# Patient Record
Sex: FEMALE | Race: WHITE | Employment: FULL TIME | URBAN - METROPOLITAN AREA
[De-identification: names, ages, dates, MRNs, and addresses within clinical notes are randomized per-mention and may not be internally consistent; named-entity substitution may affect disease eponyms.]

---

## 2017-02-15 ENCOUNTER — OFFICE VISIT (OUTPATIENT)
Dept: DERMATOLOGY | Facility: CLINIC | Age: 52
End: 2017-02-15
Payer: COMMERCIAL

## 2017-02-15 VITALS — SYSTOLIC BLOOD PRESSURE: 147 MMHG | DIASTOLIC BLOOD PRESSURE: 86 MMHG | OXYGEN SATURATION: 100 % | HEART RATE: 80 BPM

## 2017-02-15 DIAGNOSIS — K14.6 BURNING MOUTH SYNDROME: Primary | ICD-10-CM

## 2017-02-15 DIAGNOSIS — L70.0 ACNE VULGARIS: ICD-10-CM

## 2017-02-15 LAB
ANION GAP SERPL CALCULATED.3IONS-SCNC: 9 MMOL/L (ref 3–14)
BUN SERPL-MCNC: 6 MG/DL (ref 7–30)
CALCIUM SERPL-MCNC: 9.4 MG/DL (ref 8.5–10.1)
CHLORIDE SERPL-SCNC: 101 MMOL/L (ref 94–109)
CO2 SERPL-SCNC: 27 MMOL/L (ref 20–32)
CREAT SERPL-MCNC: 0.79 MG/DL (ref 0.52–1.04)
FERRITIN SERPL-MCNC: 71 NG/ML (ref 8–252)
GFR SERPL CREATININE-BSD FRML MDRD: 77 ML/MIN/1.7M2
GLUCOSE SERPL-MCNC: 84 MG/DL (ref 70–99)
POTASSIUM SERPL-SCNC: 4.2 MMOL/L (ref 3.4–5.3)
SODIUM SERPL-SCNC: 137 MMOL/L (ref 133–144)
TSH SERPL DL<=0.005 MIU/L-ACNC: 1.78 MU/L (ref 0.4–4)
VIT B12 SERPL-MCNC: 652 PG/ML (ref 193–986)

## 2017-02-15 PROCEDURE — 80048 BASIC METABOLIC PNL TOTAL CA: CPT | Performed by: PHYSICIAN ASSISTANT

## 2017-02-15 PROCEDURE — 84425 ASSAY OF VITAMIN B-1: CPT | Mod: 90 | Performed by: PHYSICIAN ASSISTANT

## 2017-02-15 PROCEDURE — 84207 ASSAY OF VITAMIN B-6: CPT | Mod: 90 | Performed by: PHYSICIAN ASSISTANT

## 2017-02-15 PROCEDURE — 82607 VITAMIN B-12: CPT | Performed by: PHYSICIAN ASSISTANT

## 2017-02-15 PROCEDURE — 84443 ASSAY THYROID STIM HORMONE: CPT | Performed by: PHYSICIAN ASSISTANT

## 2017-02-15 PROCEDURE — 84252 ASSAY OF VITAMIN B-2: CPT | Mod: 90 | Performed by: PHYSICIAN ASSISTANT

## 2017-02-15 PROCEDURE — 99213 OFFICE O/P EST LOW 20 MIN: CPT | Performed by: PHYSICIAN ASSISTANT

## 2017-02-15 PROCEDURE — 84630 ASSAY OF ZINC: CPT | Mod: 90 | Performed by: PHYSICIAN ASSISTANT

## 2017-02-15 PROCEDURE — 99000 SPECIMEN HANDLING OFFICE-LAB: CPT | Performed by: PHYSICIAN ASSISTANT

## 2017-02-15 PROCEDURE — 82728 ASSAY OF FERRITIN: CPT | Performed by: PHYSICIAN ASSISTANT

## 2017-02-15 PROCEDURE — 36415 COLL VENOUS BLD VENIPUNCTURE: CPT | Performed by: PHYSICIAN ASSISTANT

## 2017-02-15 RX ORDER — LEVOTHYROXINE SODIUM 100 UG/1
100 TABLET ORAL DAILY
COMMUNITY

## 2017-02-15 RX ORDER — FLUOCINOLONE ACETONIDE 0.25 MG/G
OINTMENT TOPICAL
Qty: 15 G | Refills: 0 | Status: SHIPPED | OUTPATIENT
Start: 2017-02-15 | End: 2018-02-27

## 2017-02-15 RX ORDER — SPIRONOLACTONE 50 MG/1
TABLET, FILM COATED ORAL
Qty: 90 TABLET | Refills: 5 | Status: SHIPPED | OUTPATIENT
Start: 2017-02-15 | End: 2017-09-08

## 2017-02-15 NOTE — MR AVS SNAPSHOT
"              After Visit Summary   2/15/2017    Lolly Azevedo    MRN: [id number]           Patient Information     Date Of Birth          1965        Visit Information        Provider Department      2/15/2017 8:20 AM Chaparrita Roberts PA-C [facility]        Today's Diagnoses     Burning mouth syndrome    -  1    Lip licking dermatitis        Acne vulgaris          Care Instructions    Apply Aquaphor or Vaseline on lips.  Apply fluocinolone ointment twice daily as needed.       Check labs today.   Start spironolactone 100 mg in the morning and 50 mg in the evening.   Continue tretinoin.         Follow-ups after your visit        Who to contact     If you have questions or need follow up information about today's clinic visit or your schedule please contact [facility] directly at [phone].  Normal or non-critical lab and imaging results will be communicated to you by [email or web address], letter or phone within 4 business days after the clinic has received the results. If you do not hear from us within 7 days, please contact the clinic through [email or web address] or phone. If you have a critical or abnormal lab result, we will notify you by phone as soon as possible.  Submit refill requests through itzat or call your pharmacy and they will forward the refill request to us. Please allow 3 business days for your refill to be completed.          Additional Information About Your Visit        MyChart Information     itzat lets you send messages to your doctor, view your test results, renew your prescriptions, schedule appointments and more. To sign up, go to [email or web address] . Click on \"Log in\" on the left side of the screen, which will take you to the Welcome page. Then click on \"Sign up Now\" on the right side of the page.     You will be asked to enter the access code listed below, as well as some personal information. Please follow the directions to create your username and " password.     Your access code is: [email or web address]  Expires: 2017  8:41 AM     Your access code will  in 90 days. If you need help or a new code, please call your [facility] or [phone].        Care EveryWhere ID     This is your Care EveryWhere ID. This could be used by other organizations to access your [facility] medical records  [id number]        Your Vitals Were     Pulse Pulse Oximetry                80 100%           Blood Pressure from Last 3 Encounters:   02/15/17 147/86   16 117/85    Weight from Last 3 Encounters:   No data found for Wt              We Performed the Following     Basic metabolic panel  (Ca, Cl, CO2, Creat, Gluc, K, Na, BUN)     TSH with free T4 reflex     Vitamin B1 whole blood     Vitamin B12     Vitamin B6          Today's Medication Changes          These changes are accurate as of: 2/15/17  8:41 AM.  If you have any questions, ask your nurse or doctor.               Start taking these medicines.        Dose/Directions    fluocinolone 0.025 % ointment   Commonly known as:  SYNALAR   Used for:  Lip licking dermatitis   Started by:  Chaparrita Roberts PA-C        Apply twice daily to lips for 2-3 weeks.   Quantity:  15 g   Refills:  0         These medicines have changed or have updated prescriptions.        Dose/Directions    * spironolactone 50 MG tablet   Commonly known as:  ALDACTONE   This may have changed:  Another medication with the same name was added. Make sure you understand how and when to take each.   Used for:  Acne vulgaris   Changed by:  Simeon Ty MD        Dose:  50 mg   Take 1 tablet (50 mg) by mouth 2 times daily   Quantity:  180 tablet   Refills:  3       * spironolactone 50 MG tablet   Commonly known as:  ALDACTONE   This may have changed:  You were already taking a medication with the same name, and this prescription was added. Make sure you understand how and when to take each.   Used for:  Acne vulgaris   Changed by:   Chaparrita Roberts PA-C        Take 100 mg (2 pills) in the morning and 50 mg in the evening (1 pill).   Quantity:  90 tablet   Refills:  5       * Notice:  This list has 2 medication(s) that are the same as other medications prescribed for you. Read the directions carefully, and ask your doctor or other care provider to review them with you.         Where to get your medicines      These medications were sent to [id number] - [place], [place] - [address]  [address] [address] [address]     Phone:  [phone]     fluocinolone 0.025 % ointment    spironolactone 50 MG tablet                Primary Care Provider    David Cnonolly MD       XXX RETIRED XXX XXX  [place] [address]        Thank you!     Thank you for choosing [facility]  for your care. Our goal is always to provide you with excellent care. Hearing back from our patients is one way we can continue to improve our services. Please take a few minutes to complete the written survey that you may receive in the mail after your visit with us. Thank you!             Your Updated Medication List - Protect others around you: Learn how to safely use, store and throw away your medicines at www.disposemymeds.org.          This list is accurate as of: 2/15/17  8:41 AM.  Always use your most recent med list.                   Brand Name Dispense Instructions for use    fluocinolone 0.025 % ointment    SYNALAR    15 g    Apply twice daily to lips for 2-3 weeks.       LASIX PO      Take by mouth daily       levothyroxine 100 MCG tablet    SYNTHROID/LEVOTHROID     Take 100 mcg by mouth daily       POTASSIUM CITRATE PO      Take 20 mEq by mouth       * spironolactone 50 MG tablet    ALDACTONE    180 tablet    Take 1 tablet (50 mg) by mouth 2 times daily       * spironolactone 50 MG tablet    ALDACTONE    90 tablet    Take 100 mg (2 pills) in the morning and 50 mg in the evening (1 pill).       TEMAZEPAM  PO      Take by mouth At Bedtime       tretinoin 0.1 % cream    RETIN-A    20 g    Apply a pea sized amount at bedtime       * Notice:  This list has 2 medication(s) that are the same as other medications prescribed for you. Read the directions carefully, and ask your doctor or other care provider to review them with you.

## 2017-02-15 NOTE — NURSING NOTE
Chief Complaint   Patient presents with     Derm Problem     fu bumps and lips       Initial /86  Pulse 80  SpO2 100% There is no height or weight on file to calculate BMI.  Medication Reconciliation: unable or not appropriate to perform     Deedee Cavazos LPN.................2/15/2017

## 2017-02-15 NOTE — PROGRESS NOTES
Lolly Azevedo is a 51 year old year old female patient here today for recheck acne vulgaris on lower half of face and rash on lips.  Patient reports that she is taking spironolactone 50 mg twice daily. She denies any side effects from medication. She also notes within the last two month she has had some peeling lips. She reports she has tried may different lip balms with little improvement. She reports that it will seem to improve at times but will also have a burning sensation to both lips. She reports aquaphor lip balm seem to help the most.  Patient has no other skin complaints today.  Remainder of the HPI, Meds, PMH, Allergies, FH, and SH was reviewed in chart.    Pertinent Hx:  Acne Vulgaris   History reviewed. No pertinent past medical history.    History reviewed. No pertinent past surgical history.     Family History   Problem Relation Age of Onset     CANCER Mother      skin cancer       Social History     Social History     Marital status:      Spouse name: N/A     Number of children: N/A     Years of education: N/A     Occupational History     Not on file.     Social History Main Topics     Smoking status: Never Smoker     Smokeless tobacco: Not on file     Alcohol use Not on file     Drug use: Not on file     Sexual activity: Not on file     Other Topics Concern     Not on file     Social History Narrative     No narrative on file       Outpatient Encounter Prescriptions as of 2/15/2017   Medication Sig Dispense Refill     levothyroxine (SYNTHROID/LEVOTHROID) 100 MCG tablet Take 100 mcg by mouth daily       POTASSIUM CITRATE PO Take 20 mEq by mouth       Furosemide (LASIX PO) Take by mouth daily       TEMAZEPAM PO Take by mouth At Bedtime       fluocinolone (SYNALAR) 0.025 % ointment Apply twice daily to lips for 2-3 weeks. 15 g 0     spironolactone (ALDACTONE) 50 MG tablet Take 100 mg (2 pills) in the morning and 50 mg in the evening (1 pill). 90 tablet 5     tretinoin (RETIN-A) 0.1 % cream  Apply a pea sized amount at bedtime 20 g 3     spironolactone (ALDACTONE) 50 MG tablet Take 1 tablet (50 mg) by mouth 2 times daily 180 tablet 3     No facility-administered encounter medications on file as of 2/15/2017.              Review Of Systems  Skin: As above  Eyes: negative  Ears/Nose/Throat: negative  Respiratory: No shortness of breath, dyspnea on exertion, cough, or hemoptysis  Cardiovascular: negative  Gastrointestinal: negative  Genitourinary: negative  Musculoskeletal: negative  Neurologic: negative  Psychiatric: negative  Hematologic/Lymphatic/Immunologic: negative  Endocrine: negative      O:   NAD, WDWN, Alert & Oriented, Mood & Affect wnl, Vitals stable   Here today alone   /86  Pulse 80  SpO2 100%   General appearance normal   Vitals stable   Alert, oriented and in no acute distress      Dry, irritation upper and lower lip   No open wounds on lips   No visible inflammatory nodules, papules on face     Eyes: Conjunctivae/lids:Normal     ENT: Lips    MSK:Normal    Pulm: Breathing Normal    Neuro/Psych: Orientation:Normal; Mood/Affect:Normal  A/P:  1. Favor irritant dermatitis on lips   Will check Vit B12, Vit B1, B2, B6, zinc, ferritin levels, and TSH to make sure nothing else is contributing to burning sensation.   Use Aquaphor ointment (for skin on lips) 4-5 times daily.   Apply fluocinolone ointment two to three times daily on lips.   Avoid using any other lip products.   If not improving in 2 weeks follow-up for biopsy.   2. Acne Vulgaris  Improving but still having some flaring.   Will increase spironolactone 100 mg in the morning and 50 mg in the evening.   Will check BMP today.   Continue tretinoin 0.1% cream at bedtime.   Wear daily sunscreen.

## 2017-02-15 NOTE — PATIENT INSTRUCTIONS
Apply Aquaphor or Vaseline on lips.  Apply fluocinolone ointment twice daily as needed.       Check labs today.   Start spironolactone 100 mg in the morning and 50 mg in the evening.   Continue tretinoin.

## 2017-02-16 LAB — ZINC SERPL-MCNC: 96 UG/ML

## 2017-02-17 LAB
VIT B1 BLD-MCNC: 77 UG/DL
VIT B6 SERPL-MCNC: 227.5 NG/ML

## 2017-02-23 LAB — VIT B2 SERPL-MCNC: 8 UG/DL

## 2017-02-27 ENCOUNTER — TELEPHONE (OUTPATIENT)
Dept: NURSING | Facility: CLINIC | Age: 52
End: 2017-02-27

## 2017-02-27 NOTE — TELEPHONE ENCOUNTER
"Call Type: Triage Call    Presenting Problem: Patient returning clinic call regarding labs.  Gave message per MD/epic: Notes Recorded by Chaparrita Roberts PA-C on 2/24/2017 at 7:12 AM  Vitamin B2 is normal. Does she take an  extra vitamin B supplement? Her vitamin b6 is elevated, sometimes  this can cause lip burning sensation.  \"The only thing I take are 2  adult gummy multi vitamins /day\". Pt. will follow up with MD if  needed. \"The medication she gave me for my lip has really worked.\"  Triage Note:  Guideline Title: Information Only Call; No Symptom Triage (Adult)  Recommended Disposition: Provide Information or Advice Only  Original Inclination: Wanted to speak with a nurse  Override Disposition:  Intended Action: Follow advice given  Physician Contacted: No  Requesting information regarding scheduled exam, test or procedure; no triage  required. Information provided from approved resources or clinical experience. ?  YES  Requesting regular office appointment ? NO  Sign(s) or symptom(s) associated with a diagnosed condition or with a new illness  ? NO  Requesting information about provider, services or community resources ? NO  Call back to complete assessment/clarification of information from prior caller to  complete triage ? NO  Requesting information and provider is best resource; no triage required. ? NO  Caller not with patient and is unable to provide clinical information about  patient to facilitate triage. ? NO  Requesting provider information for recently scheduled test, procedure; no triage  required. Needed information not available per approved resources or clinical  experience. ? NO  Requesting information not available per approved reference or clinical  experience; no triage required. ? NO  Physician Instructions:  Care Advice:  "

## 2017-09-08 DIAGNOSIS — L70.0 ACNE VULGARIS: ICD-10-CM

## 2017-09-08 RX ORDER — SPIRONOLACTONE 50 MG/1
TABLET, FILM COATED ORAL
Qty: 90 TABLET | Refills: 5 | Status: SHIPPED | OUTPATIENT
Start: 2017-09-08 | End: 2018-03-09

## 2017-12-20 ENCOUNTER — OFFICE VISIT (OUTPATIENT)
Dept: SLEEP MEDICINE | Facility: CLINIC | Age: 52
End: 2017-12-20
Payer: COMMERCIAL

## 2017-12-20 VITALS
OXYGEN SATURATION: 100 % | DIASTOLIC BLOOD PRESSURE: 69 MMHG | WEIGHT: 145.2 LBS | SYSTOLIC BLOOD PRESSURE: 98 MMHG | HEART RATE: 72 BPM | HEIGHT: 69 IN | BODY MASS INDEX: 21.51 KG/M2

## 2017-12-20 DIAGNOSIS — F51.04 PSYCHOPHYSIOLOGICAL INSOMNIA: Primary | ICD-10-CM

## 2017-12-20 PROCEDURE — 99205 OFFICE O/P NEW HI 60 MIN: CPT | Performed by: FAMILY MEDICINE

## 2017-12-20 NOTE — NURSING NOTE
"Chief Complaint   Patient presents with     Sleep Problem       Initial BP 98/69  Pulse 72  Ht 1.746 m (5' 8.75\")  Wt 65.9 kg (145 lb 3.2 oz)  SpO2 100%  BMI 21.6 kg/m2 Estimated body mass index is 21.6 kg/(m^2) as calculated from the following:    Height as of this encounter: 1.746 m (5' 8.75\").    Weight as of this encounter: 65.9 kg (145 lb 3.2 oz).  Medication Reconciliation: complete   "
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
## 2017-12-20 NOTE — PATIENT INSTRUCTIONS
Your BMI is Body mass index is 21.6 kg/(m^2).  Weight management is a personal decision.  If you are interested in exploring weight loss strategies, the following discussion covers the approaches that may be successful. Body mass index (BMI) is one way to tell whether you are at a healthy weight, overweight, or obese. It measures your weight in relation to your height.  A BMI of 18.5 to 24.9 is in the healthy range. A person with a BMI of 25 to 29.9 is considered overweight, and someone with a BMI of 30 or greater is considered obese. More than two-thirds of [place] adults are considered overweight or obese.  Being overweight or obese increases the risk for further weight gain. Excess weight may lead to heart disease and diabetes.  Creating and following plans for healthy eating and physical activity may help you improve your health.  Weight control is part of healthy lifestyle and includes exercise, emotional health, and healthy eating habits. Careful eating habits lifelong are the mainstay of weight control. Though there are significant health benefits from weight loss, long-term weight loss with diet alone may be very difficult to achieve- studies show long-term success with dietary management in less than 10% of people. Attaining a healthy weight may be especially difficult to achieve in those with severe obesity. In some cases, medications, devices and surgical management might be considered.  What can you do?  If you are overweight or obese and are interested in methods for weight loss, you should discuss this with your provider.     Consider reducing daily calorie intake by 500 calories.     Keep a food journal.     Avoiding skipping meals, consider cutting portions instead.    Diet combined with exercise helps maintain muscle while optimizing fat loss. Strength training is particularly important for building and maintaining muscle mass. Exercise helps reduce stress, increase energy, and improves fitness.  Increasing exercise without diet control, however, may not burn enough calories to loose weight.       Start walking three days a week 10-20 minutes at a time    Work towards walking thirty minutes five days a week     Eventually, increase the speed of your walking for 1-2 minutes at time    In addition, we recommend that you review healthy lifestyles and methods for weight loss available through the National Institutes of Health patient information sites:  http://win.niddk.nih.gov/publications/index.htm    And look into health and wellness programs that may be available through your health insurance provider, employer, local community center, or violet club.       [facility]    Good sleeping habits are a key part of treatment. If needed, and only in very select cases, some medications may help you sleep better at first. Making healthy lifestyle changes and learning to relax can improve your overall sleep in the long run. As many long term changes and treatments, treating insomnia takes commitment and hard work, but trust that your efforts will pay off.  We are recommending treatment of insomnia based on your history of sleep problems and/or use of medications for sleeping.  There are many treatment options available and we want to work with you to find the right treatment for you.  The following recommendations can be helpful for some people.        Sleep Hygiene     Sometimes insomnia can be made worse by our own habits or situation.  You should consider making some of the following changes to help improve your sleep:       If there is excessive noise in your house / neighborhood use a fan or similar device to make a quiet background noise that can drown out other noises.    If your room is too bright early in the morning, hang a blanket or extra curtain over the windows to keep the light out or use a sleeping mask to cover your eyes.    If your room is too warm during the night, set the temperature  in the house down a few degrees for the night.    Spend a little time before bedtime trying to relax.  Do not work right up until bedtime.  Take 30-60 minutes to relax and unwind before bedtime with a book or watching TV.    Do not drink anything with alcohol or caffeine in them for at least 4 to 5 hours before bedtime.    Do not smoke right before bedtime or during the night.    No strenuous exercise for 2-3 hours before bedtime.      In addition to the abovementioned recommendations, cognitive behavioral treatment for insomnia (CBTI) is a very effective technique that involves changing your behaviors and thoughts associated with sleep. In fact, CBTI is the most effective treatment for long-term insomnia. This treatment modality tries to address the underlying causes of your sleep problems, including your habits and how you think about sleep.    People that are motivated to make changes in their sleep pattern are likely to benefit from internet based cognitive behavioral treatment for insomnia. Some internet CBTI programs include but are not limited to www.[employer] or www.[facility].FlowPay.  There is a fee for using these programs that is similar to actually seeing an insomnia expert. By entering the code  Rye  it will allow us to know if you find these services useful.        Online Programs for CBTI       www.[employer] (pronounced shut eye) - There is a fee for this program.    www.sleepIO.com (pronounced sleep ee oh) - There is a fee for this program. Enter the code  Rye  if you decide to enroll in this program.       In addition to the internet programs provided, self-help strategies for treatment of insomnia can be found also in books.  Several treatment books for insomnia are listed on our patient treatment resources.  Some of these include the following books:       Suggested Resources - Insomnia Treatment Books       Overcoming Insomnia  by Angel Mares and Julia Doyle (2008)     No More  Sleepless Nights  by Lucien Vallecillo and Cristal Mak (1996)     Say Monserrat to Insomnia  by Gus Vail (2009)     The Insomnia Workbook  by Noelle Roberson and Avinash Hammond (2009)     The Insomnia Answer  by Imer Wyatt and Live Pizarro (2006)     These recommendations are a first step in treating insomnia.  For many people these recommendations can be helpful while for others they are just the start. There are many treatments available for insomnia and our goal is to keep re-evaluating your progress and try other options if these first steps are not successful.  It has been demonstrated that, in the long run, CBTI modalities are more effective than any medication for insomnia. As such, if the above the recommendations do not help, you may benefit from scheduling an appointment with an insomnia expert (we can assess this during follow up visits).      Healthy Lifestyle  As it was mentioned, your lifestyle directly affects your health and your sleep patterns. Here are some healthy habits that you should consider:    Keep a regular sleep schedule, always going to bed and getting up at the same time each day.    Exercise regularly. It may help you reduce stress. However, remember to avoid strenuous exercise for two to four hours before bedtime.    Limit, or if possible, avoid all together naps.    Use your bed only for sleep and sex. This means, no reading, watching TV, using tablets or phones, or doing any other activities in bed.    Do not spend too much time in bed trying to fall asleep. If you cannot fall asleep within 20 to 30 minutes, get up and do something relaxing until you become tired and drowsy again. Do not expose yourself to bright lights or perform complex activities (e.g., no house cleaning, watching TV, or working on homework).    Avoid or limit caffeine and nicotine. They can keep you awake at night. Also avoid alcohol. It may help you fall asleep at first, but your sleep will not be  restful.      Before Bedtime  In addition to keeping some healthy habits, it is important to know what do to before going to sleep. As such, to also sleep better every night, try these tips:    Have a bedtime routine to let your body and mind know when it is time to sleep.    Going to bed should be relaxing so try to do only relaxing things around bedtime. Sleep will come sooner.    If your worries do not let you sleep, write them down in a diary. Then close it and go to bed.    As previously mentioned, make sure the room is not too hot or too cold. If it is not dark enough, an eye mask may help. If it is noisy, try using earplugs.      Learn to Relax  In addition to poor habits, stress, anxiety, and body tension may play a large role in your inability to sleep through the night. In fact, these factors may keep you awake at night. To unwind before bedtime, try reading a book, meditation, or even yoga. Also, try the following:    Deep breathing: Sit or lie back in a chair. Take a slow, deep breath. Hold it for 5 counts. Then breathe out slowly through your mouth. Keep doing this until you feel relaxed.    Imagery: Think of the last fun trip you took. In your mind, walk through the trip from start to finish. Put as much detail into the memory as you can remember. It will also help you relax.      Suggested Resources    Insomnia Treatment Books      Overcoming Insomnia  by Angel Mares and Julia Doyle (2008)     No More Sleepless Nights  by Lucien Vallecillo and Cristal Mak (1996)     Say Monserrat to Insomnia  by Gus Vail (2009)     The Insomnia Workbook  by Noelle Roberson and Avinash Hammond (2009)     The Insomnia Answer  by Imer Wyatt and Live Pizarro (2006)    Stress Management and Relaxation Books    The Relaxation and Stress Reduction Workbook by Milady Staples, Nona Marie and Kavon Remy (2008)    Stress Management Workbook: Techniques and Self-Assessment Procedures by Jennie  [facility] Garcia and Alexis Magallon (1997)    A Mindfulness-Based Stress Reduction Workbook by Reji Valdez and Carly Grijalva (2010)    The Complete Stress Management Workbook by Doug Sloan, Han Figueroa and Yobani Loving (1996)    Assert Yourself by Domitila Elam and Tim Elam (1977)    Relaxation Resources for Computer Download   These websites offer resources to help you relax. This list is for information only. [facility] is not responsible for the quality of services or the actions of any person or organization.    Progressive Muscle Relaxation (PMR):     http://www.MiniMonos/progressive-muscle-relaxation-exercise.html     http://studentsupport.[facility]/counseling/resources/self-help/relaxation-and-stress-management/     Deep Breathing Exercises:    http://www.MiniMonos/breathing-awareness.html     Meditation:     wwwBattery Medics    www.KiiguidedFree & ClearmeditationFree & Clearsite.DeckDAQ (you may have to pay for some of these resources)    Guided Imagery:    http://www.MiniMonos/guided-imagery-scripts.html     http://MedPlasts/library/gcwdkbulkx-pokwye-xssfpez/     Counseling / Behavioral Health    [facility]    Visit [email or web address] or call [phone] to find a clinic close to you.      This is not a prescription and these resources are optional. You must pay for any costs when using these resources. Please ask your insurance carrier if you can be reimbursed for these resources. If so, you are responsible for sending the needed details to your insurance carrier. These resources may also be tax deductible as medical expenses. Check with your .     These programs and publications are not affiliated in any way with [facility].    Stress, tension, anxiety and depression can be big problems that contribute to insomnia.  If you can t relax your mind and body, then you won t be able to sleep.  You seem to have a high  level of stress in your life and would likely benefit from professional stress reduction / anxiety management strategies and should contact [facility] at [phone] to schedule an appointment.       If you are experiencing extreme anxiety or distress due to insomnia symptoms and feel that you need immediate assistance, please contact the [facility] at [phone].    Please, call and make appointment with sleep psychologist. Complete and bring with you the sleep logs.    Thank you so much and happy holidays!!    Sincerely,    Emmanuel Albert MD, MPH  Clinical Sleep Medicine

## 2017-12-20 NOTE — MR AVS SNAPSHOT
After Visit Summary   12/20/2017    Lolly Azevedo    MRN: [id number]           Patient Information     Date Of Birth          1965        Visit Information        Provider Department      12/20/2017 9:30 AM Emmanuel Albert MD [facility]        Today's Diagnoses     Psychophysiological insomnia    -  1      Care Instructions      Your BMI is Body mass index is 21.6 kg/(m^2).  Weight management is a personal decision.  If you are interested in exploring weight loss strategies, the following discussion covers the approaches that may be successful. Body mass index (BMI) is one way to tell whether you are at a healthy weight, overweight, or obese. It measures your weight in relation to your height.  A BMI of 18.5 to 24.9 is in the healthy range. A person with a BMI of 25 to 29.9 is considered overweight, and someone with a BMI of 30 or greater is considered obese. More than two-thirds of American adults are considered overweight or obese.  Being overweight or obese increases the risk for further weight gain. Excess weight may lead to heart disease and diabetes.  Creating and following plans for healthy eating and physical activity may help you improve your health.  Weight control is part of healthy lifestyle and includes exercise, emotional health, and healthy eating habits. Careful eating habits lifelong are the mainstay of weight control. Though there are significant health benefits from weight loss, long-term weight loss with diet alone may be very difficult to achieve- studies show long-term success with dietary management in less than 10% of people. Attaining a healthy weight may be especially difficult to achieve in those with severe obesity. In some cases, medications, devices and surgical management might be considered.  What can you do?  If you are overweight or obese and are interested in methods for weight loss, you should discuss this with your provider.      Consider reducing daily calorie intake by 500 calories.     Keep a food journal.     Avoiding skipping meals, consider cutting portions instead.    Diet combined with exercise helps maintain muscle while optimizing fat loss. Strength training is particularly important for building and maintaining muscle mass. Exercise helps reduce stress, increase energy, and improves fitness. Increasing exercise without diet control, however, may not burn enough calories to loose weight.       Start walking three days a week 10-20 minutes at a time    Work towards walking thirty minutes five days a week     Eventually, increase the speed of your walking for 1-2 minutes at time    In addition, we recommend that you review healthy lifestyles and methods for weight loss available through the National Institutes of Health patient information sites:  http://win.niddk.nih.gov/publications/index.htm    And look into health and wellness programs that may be available through your health insurance provider, employer, local community center, or violet club.       [place] [facility]    Good sleeping habits are a key part of treatment. If needed, and only in very select cases, some medications may help you sleep better at first. Making healthy lifestyle changes and learning to relax can improve your overall sleep in the long run. As many long term changes and treatments, treating insomnia takes commitment and hard work, but trust that your efforts will pay off.  We are recommending treatment of insomnia based on your history of sleep problems and/or use of medications for sleeping.  There are many treatment options available and we want to work with you to find the right treatment for you.  The following recommendations can be helpful for some people.        Sleep Hygiene     Sometimes insomnia can be made worse by our own habits or situation.  You should consider making some of the following changes to help improve your sleep:        If there is excessive noise in your house / neighborhood use a fan or similar device to make a quiet background noise that can drown out other noises.    If your room is too bright early in the morning, hang a blanket or extra curtain over the windows to keep the light out or use a sleeping mask to cover your eyes.    If your room is too warm during the night, set the temperature in the house down a few degrees for the night.    Spend a little time before bedtime trying to relax.  Do not work right up until bedtime.  Take 30-60 minutes to relax and unwind before bedtime with a book or watching TV.    Do not drink anything with alcohol or caffeine in them for at least 4 to 5 hours before bedtime.    Do not smoke right before bedtime or during the night.    No strenuous exercise for 2-3 hours before bedtime.      In addition to the abovementioned recommendations, cognitive behavioral treatment for insomnia (CBTI) is a very effective technique that involves changing your behaviors and thoughts associated with sleep. In fact, CBTI is the most effective treatment for long-term insomnia. This treatment modality tries to address the underlying causes of your sleep problems, including your habits and how you think about sleep.    People that are motivated to make changes in their sleep pattern are likely to benefit from internet based cognitive behavioral treatment for insomnia. Some internet CBTI programs include but are not limited to www.[employer] or www.[employer]4Less.  There is a fee for using these programs that is similar to actually seeing an insomnia expert. By entering the code  [place]  it will allow us to know if you find these services useful.        Online Programs for CBTI       www.[employer] (pronounced shut eye) - There is a fee for this program.    www.sleepIO.com (pronounced sleep ee oh) - There is a fee for this program. Enter the code  [place]  if you decide to enroll in this program.       In  addition to the internet programs provided, self-help strategies for treatment of insomnia can be found also in books.  Several treatment books for insomnia are listed on our patient treatment resources.  Some of these include the following books:       Suggested Resources - Insomnia Treatment Books       Overcoming Insomnia  by Angel Mares and Julia Doyle (2008)     No More Sleepless Nights  by Lucien Vallecillo and Cristal Mak (1996)     Say Monserrat to Insomnia  by Gus Vail (2009)     The Insomnia Workbook  by Noelle Roberson and Avinash Hammond (2009)     The Insomnia Answer  by Imer Wyatt and Live Pizarro (2006)     These recommendations are a first step in treating insomnia.  For many people these recommendations can be helpful while for others they are just the start. There are many treatments available for insomnia and our goal is to keep re-evaluating your progress and try other options if these first steps are not successful.  It has been demonstrated that, in the long run, CBTI modalities are more effective than any medication for insomnia. As such, if the above the recommendations do not help, you may benefit from scheduling an appointment with an insomnia expert (we can assess this during follow up visits).      Healthy Lifestyle  As it was mentioned, your lifestyle directly affects your health and your sleep patterns. Here are some healthy habits that you should consider:    Keep a regular sleep schedule, always going to bed and getting up at the same time each day.    Exercise regularly. It may help you reduce stress. However, remember to avoid strenuous exercise for two to four hours before bedtime.    Limit, or if possible, avoid all together naps.    Use your bed only for sleep and sex. This means, no reading, watching TV, using tablets or phones, or doing any other activities in bed.    Do not spend too much time in bed trying to fall asleep. If you cannot fall asleep within 20  to 30 minutes, get up and do something relaxing until you become tired and drowsy again. Do not expose yourself to bright lights or perform complex activities (e.g., no house cleaning, watching TV, or working on homework).    Avoid or limit caffeine and nicotine. They can keep you awake at night. Also avoid alcohol. It may help you fall asleep at first, but your sleep will not be restful.      Before Bedtime  In addition to keeping some healthy habits, it is important to know what do to before going to sleep. As such, to also sleep better every night, try these tips:    Have a bedtime routine to let your body and mind know when it is time to sleep.    Going to bed should be relaxing so try to do only relaxing things around bedtime. Sleep will come sooner.    If your worries do not let you sleep, write them down in a diary. Then close it and go to bed.    As previously mentioned, make sure the room is not too hot or too cold. If it is not dark enough, an eye mask may help. If it is noisy, try using earplugs.      Learn to Relax  In addition to poor habits, stress, anxiety, and body tension may play a large role in your inability to sleep through the night. In fact, these factors may keep you awake at night. To unwind before bedtime, try reading a book, meditation, or even yoga. Also, try the following:    Deep breathing: Sit or lie back in a chair. Take a slow, deep breath. Hold it for 5 counts. Then breathe out slowly through your mouth. Keep doing this until you feel relaxed.    Imagery: Think of the last fun trip you took. In your mind, walk through the trip from start to finish. Put as much detail into the memory as you can remember. It will also help you relax.      Suggested Resources    Insomnia Treatment Books      Overcoming Insomnia  by Angel Mares and Julia Doyle (2008)     No More Sleepless Nights  by Lucien Vallecillo and Cristal Mak (1996)     Say Monserrat to Insomnia  by Gus Vail  (2009)     The Insomnia Workbook  by Noelle Roberson and Avinash Hammond (2009)     The Insomnia Answer  by Imer Wyatt and Live Pizarro (2006)    Stress Management and Relaxation Books    The Relaxation and Stress Reduction Workbook by Milady Staples, Nona Marie and Kavon Remy (2008)    Stress Management Workbook: Techniques and Self-Assessment Procedures by Jennie Garcia and Alexis Magallon (1997)    A Mindfulness-Based Stress Reduction Workbook by Reji Valdez and Carly Grijalva (2010)    The Complete Stress Management Workbook by Doug Sloan, Han Figueroa and Yobani Loving (1996)    Assert Yourself by Domitila Elam and Tim Elam (1977)    Relaxation Resources for Computer Download   These websites offer resources to help you relax. This list is for information only. [facility] is not responsible for the quality of services or the actions of any person or organization.    Progressive Muscle Relaxation (PMR):     http://www.MagTag/progressive-muscle-relaxation-exercise.html     http://studentsupport.[facility]/counseling/resources/self-help/relaxation-and-stress-management/     Deep Breathing Exercises:    http://www.MagTag/breathing-awareness.html     Meditation:     www.[place]    www.RUNformguidedTradingViewmeditation-site.[employer] (you may have to pay for some of these resources)    Guided Imagery:    http://www.MagTag/guided-imagery-scripts.html     http://[employer]/library/gwxsicodgg-azxqgo-muzaoej/     Counseling / Behavioral Health    [facility]    Visit [email or web address] or call [phone] to find a clinic close to you.      This is not a prescription and these resources are optional. You must pay for any costs when using these resources. Please ask your insurance carrier if you can be reimbursed for these resources. If so, you are responsible for sending the needed details to  your insurance carrier. These resources may also be tax deductible as medical expenses. Check with your .     These programs and publications are not affiliated in any way with [facility].    Stress, tension, anxiety and depression can be big problems that contribute to insomnia.  If you can t relax your mind and body, then you won t be able to sleep.  You seem to have a high level of stress in your life and would likely benefit from professional stress reduction / anxiety management strategies and should contact [facility] at [phone] to schedule an appointment.       If you are experiencing extreme anxiety or distress due to insomnia symptoms and feel that you need immediate assistance, please contact the [facility] at [phone].    Please, call and make appointment with sleep psychologist. Complete and bring with you the sleep logs.    Thank you so much and happy holidays!!    Sincerely,    Emmanuel Albret MD, MPH  Clinical Sleep Medicine              Follow-ups after your visit        Additional Services     SLEEP PSYCHOLOGY REFERRAL       Referral Urgency: Next available    Dr. Simeon Moore is at the following clinics on the following days:  [address]        Thursday and Friday (PLEASE CALL [phone] to schedule an appointment).  [facility] [address] [place]       Wednesdays   (PLEASE CALL [phone] to schedule an appointment).     Please be aware that coverage of these services is subject to the terms and limitations of your health insurance plan. Call member services at your health plan with any benefit or coverage questions.     Please bring the following to your appointment:  >> List of current medications   >> This referral request   >> Any documents/labs given to you for this referral                  Who to contact     If you have questions or need follow up  "information about today's clinic visit or your schedule please contact [facility] directly at [phone].  Normal or non-critical lab and imaging results will be communicated to you by MyChart, letter or phone within 4 business days after the clinic has received the results. If you do not hear from us within 7 days, please contact the clinic through [employer] or phone. If you have a critical or abnormal lab result, we will notify you by phone as soon as possible.  Submit refill requests through ROLI or call your pharmacy and they will forward the refill request to us. Please allow 3 business days for your refill to be completed.          Additional Information About Your Visit        Money360harScoreloop Information     ROLI lets you send messages to your doctor, view your test results, renew your prescriptions, schedule appointments and more. To sign up, go to [email or web address] . Click on \"Log in\" on the left side of the screen, which will take you to the Welcome page. Then click on \"Sign up Now\" on the right side of the page.     You will be asked to enter the access code listed below, as well as some personal information. Please follow the directions to create your username and password.     Your access code is: [email or web address]  Expires: 3/20/2018 10:36 AM     Your access code will  in 90 days. If you need help or a new code, please call your [facility] clinic or [phone].        Care EveryWhere ID     This is your Care EveryWhere ID. This could be used by other organizations to access your [facility] medical records  [id number]        Your Vitals Were     Pulse Height Pulse Oximetry BMI (Body Mass Index)          72 1.746 m (5' 8.75\") 100% 21.6 kg/m2         Blood Pressure from Last 3 Encounters:   17 98/69   02/15/17 147/86   16 117/85    Weight from Last 3 Encounters:   17 65.9 kg (145 lb 3.2 oz)              We Performed the Following     SLEEP PSYCHOLOGY REFERRAL     "    Primary Care Provider    David Connolly MD       XXX RETIRED XXX XXX  XXX [place] [address]        Equal Access to Services     AISHA STEVENRANJEET : Hadii hay early tracey Pagan, wanieshada isidroyariel, kriss motta drakekain, jennifer marjanin hayaagutierrez juantristen gil laBenclare snyder. So [facility] [phone].    ATENCIÓN: Si habla español, tiene a cintron disposición servicios gratuitos de asistencia lingüística. Llame al [phone].    We comply with applicable federal civil rights laws and [place] laws. We do not discriminate on the basis of race, color, national origin, age, disability, sex, sexual orientation, or gender identity.            Thank you!     Thank you for choosing [facility]  for your care. Our goal is always to provide you with excellent care. Hearing back from our patients is one way we can continue to improve our services. Please take a few minutes to complete the written survey that you may receive in the mail after your visit with us. Thank you!             Your Updated Medication List - Protect others around you: Learn how to safely use, store and throw away your medicines at www.disposemymeds.org.          This list is accurate as of: 12/20/17 10:38 AM.  Always use your most recent med list.                   Brand Name Dispense Instructions for use Diagnosis    fluocinolone 0.025 % ointment    SYNALAR    15 g    Apply twice daily to lips for 2-3 weeks.        LASIX PO      Take by mouth daily        levothyroxine 100 MCG tablet    SYNTHROID/LEVOTHROID     Take 100 mcg by mouth daily        POTASSIUM CITRATE PO      Take 20 mEq by mouth        spironolactone 50 MG tablet    ALDACTONE    90 tablet    Take 100 mg (2 pills) in the morning and 50 mg in the evening (1 pill).    Acne vulgaris       TEMAZEPAM PO      Take by mouth At Bedtime        tretinoin 0.1 % cream    RETIN-A    20 g    Apply a pea sized amount at bedtime    Acne vulgaris

## 2018-01-12 ENCOUNTER — OFFICE VISIT (OUTPATIENT)
Dept: SLEEP MEDICINE | Facility: CLINIC | Age: 53
End: 2018-01-12
Attending: FAMILY MEDICINE
Payer: COMMERCIAL

## 2018-01-12 VITALS
WEIGHT: 145 LBS | OXYGEN SATURATION: 100 % | HEART RATE: 85 BPM | HEIGHT: 69 IN | SYSTOLIC BLOOD PRESSURE: 127 MMHG | BODY MASS INDEX: 21.48 KG/M2 | DIASTOLIC BLOOD PRESSURE: 75 MMHG

## 2018-01-12 DIAGNOSIS — F51.04 PSYCHOPHYSIOLOGICAL INSOMNIA: Primary | ICD-10-CM

## 2018-01-12 PROCEDURE — 90791 PSYCH DIAGNOSTIC EVALUATION: CPT | Performed by: PSYCHOLOGIST

## 2018-01-12 ASSESSMENT — ANXIETY QUESTIONNAIRES
3. WORRYING TOO MUCH ABOUT DIFFERENT THINGS: NEARLY EVERY DAY
7. FEELING AFRAID AS IF SOMETHING AWFUL MIGHT HAPPEN: NOT AT ALL
5. BEING SO RESTLESS THAT IT IS HARD TO SIT STILL: SEVERAL DAYS
2. NOT BEING ABLE TO STOP OR CONTROL WORRYING: NEARLY EVERY DAY
IF YOU CHECKED OFF ANY PROBLEMS ON THIS QUESTIONNAIRE, HOW DIFFICULT HAVE THESE PROBLEMS MADE IT FOR YOU TO DO YOUR WORK, TAKE CARE OF THINGS AT HOME, OR GET ALONG WITH OTHER PEOPLE: SOMEWHAT DIFFICULT
1. FEELING NERVOUS, ANXIOUS, OR ON EDGE: MORE THAN HALF THE DAYS
6. BECOMING EASILY ANNOYED OR IRRITABLE: NEARLY EVERY DAY
GAD7 TOTAL SCORE: 13

## 2018-01-12 ASSESSMENT — PATIENT HEALTH QUESTIONNAIRE - PHQ9
SUM OF ALL RESPONSES TO PHQ QUESTIONS 1-9: 9
5. POOR APPETITE OR OVEREATING: SEVERAL DAYS

## 2018-01-12 NOTE — NURSING NOTE
"Chief Complaint   Patient presents with     Sleep Problem     consult-       Initial There were no vitals taken for this visit. Estimated body mass index is 21.6 kg/(m^2) as calculated from the following:    Height as of 12/20/17: 1.746 m (5' 8.75\").    Weight as of 12/20/17: 65.9 kg (145 lb 3.2 oz).  Medication Reconciliation: complete    "

## 2018-01-12 NOTE — PATIENT INSTRUCTIONS
Thank you for coming in to discuss your insomnia and cogntiive behavioral therapy for insomnia.     You are a good candidate for behavioral treatment for insomnia.  Use of temazepam at high doses is not indicatged for long term use and there is increasing evidence that it is contraindicated.  The more effective long term treatment for insomnia is CBTI.    Continue to work with your primary care physician around management of your anxiety.  This contributes to your insomnia and with adequate treatment may help improve your sleep as well.  You are reporting good reponse to Lexapro.    Today we are prescribing you a fixed sleep schedule of 8 hours between 10 PM and 6 AM.    Avoid later evening use of alcohol as it impairs sleep and is not recommended to be combined when taking a benzodiazepine.    Within your sleep window you should follow these rules for sleep:    Insomnia - Stimulus Control  When someone lays awake in bed over many nights, your body can actually learn to be awake in bed, mainly because that is what has happened so many times.  Your body has actually been  conditioned  or trained to be awake during the night because it has happened so often.  To break this habit you should try to follow these steps to improve your insomnia:    Set a strict bedtime and rise time to keep every day of the week, including weekends as prescribed abuve.    Go to bed at the set time, but only if you are sleepy (not tired or fatigued but drowsy)    Don t lay in bed for more than 15-30 minutes if you can t sleep.  Get up and go do something relaxing like reading or watching TV until you get drowsy again     Get up at the same time every day regardless of how much sleep you get    Use the bedroom only for sleep and sex.  Do NOT watch TV, read, use the computer, play on your cell phone or do work while in bed      Do not take naps during the daytime and avoid any situations where you might get drowsy or fall asleep  unintentionally especially in the evening.               DO NOT MONITOR THE CLOCK IN THE MIDDLE OF THE NIGHT>             Stop using your phone to track your sleep.    Put the phone away at 7 PM as you have already started doing.    STRATEGIES FOR DEVELOPING A HEALTHY ATTITUDE ABOUT SLEEP    Insomnia is often is triggered by stressful events such as a change in employment, a separation, medical illness, or loss.  Your response to your sleep problem, mainly your thoughts and behaviors, determines in large part whether the sleep disturbance will resolve or develop a chronic course well after the stressful event is over.  Insomnia is more likely to persist over time if a person interprets this situational insomnia as a sign of danger or loss of control - and because of this begins to monitor sleep loss and worry about its consequences.  Unhealthy worry, fears and beliefs about insomnia can become a vicious cycle of emotional distress and increased sleep disturbance.  Negative beliefs about sleep produce increasing stress and pressure to sleep.  We call this unhelpful sleep effort. The following are strategies for changing beliefs and attitudes about sleep than may be contributing to your continued insomnia.    Changing How You Think About Insomnia      You may be surprised to learn that prior to the 1900 s, most medicines given by physicians were worthless.  Yet, remarkably, patients still improved.  How was this possible?  It was due to the patient s belief in the physician and the medicine, which mobilized strong self-healing mechanisms.  Today, this effect has been referred to as the placebo effect and it is recognized as a significant component of all medical treatments. Recent research has challenged the traditional separation of the mind and body in medicine. The placebo effect is a powerful reminder that that how we think and what we believe has a profound impact on our brain and body.  We now know that by  managing stress and thinking more positively we can improve our health and promote healing (1).  Negative sleep thoughts in particular can have a profoundly adverse effect on your insomnia. They can lead to elevated fear or anxiety about sleep which in turn can aggravate your sleep problem.    Examples of Unhelpful Sleep Thoughts    Unhelpful thoughts about sleep can have a profound impact on your ability to get a good night s sleep. Below are some examples of negative thoughts associated with insomnia that may sound familiar to you:     I must get 8 hours of sleep to function during the day      Insomnia is going to ruin my health      I have lost control of my sleep      I m dreading bedtime      I didn t sleep at all last night and know I won t sleep tomorrow night either      I can t sleep without a sleeping pill       These types of thoughts are often based on fear and myths about how sleep works. They produce feelings of anxiety and fear, emotions that fuel the stress response casing increased heart rate and brain wave activity.  This in turn activates your brain s wakefulness system and weakens your natural sleep drive resulting in increased difficulty falling and staying asleep.      Negative thoughts usually occur automatically and feel like a knee-jerk reaction.  They are often inaccurate and distorted, especially late at night as you become increasingly tired, it is dark, quiet, and others are asleep.      Cognitive Restructuring: Changing Unhelpful Sleep Thoughts    Now that you understand the impact that negative thoughts can have on your sleep, you are ready to change these unhelpful thoughts using cognitive restructuring.  The process - called cognitive restructuring   is powerfully simple:  By recognizing and replacing your negative thoughts about sleep with more accurate, positive thoughts you will be less anxious and frustrated.  You will stop working so hard to sleep.  As a result, you will be  able to relax and more easily sleep through the night.    There are several important steps involved in changing your unhelpful and negative thoughts about sleep:    1. The most important first step is recognizing and identifying your unhelpful sleep thoughts and the emotional reaction they produce.     2. A second and equally important step is to identify these thoughts without any judgment. It is important that you accept your thoughts as they are before you try to reframe and change them.     3.  The next step is to replace or reframe the negative thoughts related to sleep with more accurate and positive thoughts.  To facilitate this process, we have provided a list of positive, accurate thoughts to gradually incorporate into your beliefs about your sleep.  These positive thoughts are based on sleep research.  Read over these positive thoughts and choose those that seem to best reflect your situation.  We also encourage you to generate your own positive and accurate about your sleep.  Whenever you notice a negative thought enter you mind, replace it with one of your positive thoughts.  Rehearse these positive thoughts each day.  By practicing cognitive restructuring each day, you will notice that your frame of mind will shift leading to a much more positive attitude about your sleep.      Examples of Helpful Sleep Thoughts     My work will not suffer significantly if I have a poor night's sleep.      I m probably getting more sleep than I think.  My daytime functioning is not affected only by my sleep.      Since I didn t sleep well last night, I am more likely to sleep well tonight due  to a biological pressure to recover my sleep loss.      Sleep requirements vary from person to person.      In most cases, the worst thing that can happen if I don t sleep well is that my mood might not be as bright the next day.      If I awaken after about five and a half hours of sleep, I have gotten all the sleep I really  need.      I m more likely to fall asleep as my body temperature falls throughout the night.      It is normal to initially feel alert if I awaken at the beginning or end of a dream; drowsiness will soon follow.      My functioning will improve during the day as my body temperature rises.      My sleep will be improving as I learn and apply the behavioral techniques I am taught.      These techniques have worked for others, and they will work for me.     Other Recommendations for Changing  Beliefs and Attitudes About Your Sleep    1. Keep Realistic Expectations:  There is a widespread belief that 8 hours of sleep is necessary to feel refreshed and function well during the day. Some believe that  good sleep  means never waking up at night.  Others come to expect that they should always wake up in the morning feeling completely rested and full of energy.  Concerns and worries may arise when such expectations are not met.  You may believe that you need this much sleep but the fact remains that there are individual differences in sleep requirements.  Some require as little as 4-5 hours whereas others require 10.  Thus, there is no  gold standard  for adequate sleep duration.  To overcome insomnia, you must keep your expectations realistic and understand that some days you may not sleep as well as you desire.  Average normal sleepers experience two nights of disrupted sleep per week. There is no reason to be alarmed by a few nights of poor sleep.  To overcome insomnia you need to avoid placing undue pressure on yourself to achieve certain sleep standards as this is likely to increase performance anxiety and perpetuate your sleep problem.     2. Revise Attributions about the Causes of Insomnia:  There is a natural tendency to attribute   one s sleep problem to external factors (e.g., chemical imbalance, pain, and aging), over which     a person may have little control.  Although these factors may contribute to your sleep  disturbance, there are many things you can do to offset the effects of such factors including changing bad sleep habits and unhelpful beliefs about sleep.    3. Don t Blame Insomnia for All Daytime Impairments:  Many individuals blame insomnia for   everything that goes wrong during the day fatigue, irritability, and concentration problems.  Although poor sleep may indeed produce some of these consequences, the exclusive attribution of all daytime impairments to insomnia is erroneous and, ultimately, perpetuates the insomnia course.  So ask yourself the following question prior to blaming insomnia for a particular impairment:   Could something else be bothering me and affecting my daytime functioning?      4. Don t Catastrophize after a Poor Night s Sleep:  Sometimes worrying turns into catastrophic  thinking.  Some patients are concerned that insomnia may have serious consequences on their health, others believe poor sleep will deteriorate their physical appearance, and still others see insomnia as an indication of complete loss of control in their lives.  Quite frankly, it is often these types of concerns about the perceived consequences of insomnia, rather than the sleep problem itself, which prompt patients to seek treatment.  Keep in mind that insomnia can be very unpleasant but it is not necessarily dangerous.    5. Don t Place Too Much Emphasis on Sleep:  Some individual significantly reduce their activity level because of poor sleep and lack of energy.  Although sleep occupies nearly one third of our lives, and is a necessary part of our life, don t give it more importance than it deserves.  Make sure you continue to engage in your normal activities despite your insomnia.          6. Develop some Tolerance to the Effects of Sleep Loss:  Instead of  dwelling on insomnia and its negative effects on daytime functioning, a  more productive approach is developing some tolerance to sleep loss.   Encourage yourself  to go on with your daily routine and activities            even after a poor night s sleep.  A useful experiment is scheduling a  pleasant activity after a poor night s sleep.  You may learn that sleepiness  alone does not prevent you from doing things you enjoy.    6. Never Try to Sleep:  If we could highlight one cardinal cognitive-behavioral  rule of sleep it would be  Never Try to Force Yourself to Sleep  because sleep cannot be achieved on command.  Whenever a person tries too hard to control or to accomplish anything, it backfires and actually impairs performance producing classic performance anxiety.  Instead, change whatever unhelpful habits you have that interfere with your biological sleep system, practice relaxation, and allow sleep to come about naturally!  If you find yourself frequently trying too hard to fall asleep, you may wish to try to stay awake instead of trying to fall asleep. You will find this cannot be sustained!     1. EDWIN Vail  (1998) Say [place] to Insomnia. [employer]: [place]      Followup in 4 weeks    Keep sleep diary.    I will email your physician about a taper schedule which we will begin if approved in four weeks.    The schedule will be reducing your current 45 mg in one week intervals to 30 mg., 15 mg., 7.5 mg, 0 mg.

## 2018-01-12 NOTE — Clinical Note
Monico Benitez, Thanks for referring patient.  Seems very motivated.  She has high reiliance on temazepam as you know and her PCP is listed as inactive.  Because I am not a prescriber I need to have an MD review medication taper recommendation.  I would like to taper this patient down from 45 mg. Temazepam one week at a time while introducing CBTi to 30 mg, 15 mg, and then 0 mg.  She is rather anxious so she may benefit from an additional week at 7.5 before discontinuing.  I will indicate to her that she needs to identify and active PCP before initiating who I would expect would medically supervise the taper. Let me know if this sounds OK to you.  Oral

## 2018-01-12 NOTE — MR AVS SNAPSHOT
After Visit Summary   1/12/2018    Lolly Azevedo    MRN: [id number]           Patient Information     Date Of Birth          1965        Visit Information        Provider Department      1/12/2018 2:00 PM Simeon Moore PsyD [facility] Instructions    Thank you for coming in to discuss your insomnia and cogntiive behavioral therapy for insomnia.     You are a good candidate for behavioral treatment for insomnia.  Use of temazepam at high doses is not indicatged for long term use and there is increasing evidence that it is contraindicated.  The more effective long term treatment for insomnia is CBTI.    Continue to work with your primary care physician around management of your anxiety.  This contributes to your insomnia and with adequate treatment may help improve your sleep as well.  You are reporting good reponse to Lexapro.    Today we are prescribing you a fixed sleep schedule of 8 hours between 10 PM and 6 AM.    Avoid later evening use of alcohol as it impairs sleep and is not recommended to be combined when taking a benzodiazepine.    Within your sleep window you should follow these rules for sleep:    Insomnia - Stimulus Control  When someone lays awake in bed over many nights, your body can actually learn to be awake in bed, mainly because that is what has happened so many times.  Your body has actually been  conditioned  or trained to be awake during the night because it has happened so often.  To break this habit you should try to follow these steps to improve your insomnia:    Set a strict bedtime and rise time to keep every day of the week, including weekends as prescribed abuve.    Go to bed at the set time, but only if you are sleepy (not tired or fatigued but drowsy)    Don t lay in bed for more than 15-30 minutes if you can t sleep.  Get up and go do something relaxing like reading or watching TV until you get drowsy again     Get  up at the same time every day regardless of how much sleep you get    Use the bedroom only for sleep and sex.  Do NOT watch TV, read, use the computer, play on your cell phone or do work while in bed      Do not take naps during the daytime and avoid any situations where you might get drowsy or fall asleep unintentionally especially in the evening.               DO NOT MONITOR THE CLOCK IN THE MIDDLE OF THE NIGHT>             Stop using your phone to track your sleep.    Put the phone away at 7 PM as you have already started doing.    STRATEGIES FOR DEVELOPING A HEALTHY ATTITUDE ABOUT SLEEP    Insomnia is often is triggered by stressful events such as a change in employment, a separation, medical illness, or loss.  Your response to your sleep problem, mainly your thoughts and behaviors, determines in large part whether the sleep disturbance will resolve or develop a chronic course well after the stressful event is over.  Insomnia is more likely to persist over time if a person interprets this situational insomnia as a sign of danger or loss of control - and because of this begins to monitor sleep loss and worry about its consequences.  Unhealthy worry, fears and beliefs about insomnia can become a vicious cycle of emotional distress and increased sleep disturbance.  Negative beliefs about sleep produce increasing stress and pressure to sleep.  We call this unhelpful sleep effort. The following are strategies for changing beliefs and attitudes about sleep than may be contributing to your continued insomnia.    Changing How You Think About Insomnia      You may be surprised to learn that prior to the 1900 s, most medicines given by physicians were worthless.  Yet, remarkably, patients still improved.  How was this possible?  It was due to the patient s belief in the physician and the medicine, which mobilized strong self-healing mechanisms.  Today, this effect has been referred to as the placebo effect and it is  recognized as a significant component of all medical treatments. Recent research has challenged the traditional separation of the mind and body in medicine. The placebo effect is a powerful reminder that that how we think and what we believe has a profound impact on our brain and body.  We now know that by managing stress and thinking more positively we can improve our health and promote healing (1).  Negative sleep thoughts in particular can have a profoundly adverse effect on your insomnia. They can lead to elevated fear or anxiety about sleep which in turn can aggravate your sleep problem.    Examples of Unhelpful Sleep Thoughts    Unhelpful thoughts about sleep can have a profound impact on your ability to get a good night s sleep. Below are some examples of negative thoughts associated with insomnia that may sound familiar to you:     I must get 8 hours of sleep to function during the day      Insomnia is going to ruin my health      I have lost control of my sleep      I m dreading bedtime      I didn t sleep at all last night and know I won t sleep tomorrow night either      I can t sleep without a sleeping pill       These types of thoughts are often based on fear and myths about how sleep works. They produce feelings of anxiety and fear, emotions that fuel the stress response casing increased heart rate and brain wave activity.  This in turn activates your brain s wakefulness system and weakens your natural sleep drive resulting in increased difficulty falling and staying asleep.      Negative thoughts usually occur automatically and feel like a knee-jerk reaction.  They are often inaccurate and distorted, especially late at night as you become increasingly tired, it is dark, quiet, and others are asleep.      Cognitive Restructuring: Changing Unhelpful Sleep Thoughts    Now that you understand the impact that negative thoughts can have on your sleep, you are ready to change these unhelpful thoughts using  cognitive restructuring.  The process - called cognitive restructuring - is powerfully simple:  By recognizing and replacing your negative thoughts about sleep with more accurate, positive thoughts you will be less anxious and frustrated.  You will stop working so hard to sleep.  As a result, you will be able to relax and more easily sleep through the night.    There are several important steps involved in changing your unhelpful and negative thoughts about sleep:    1. The most important first step is recognizing and identifying your unhelpful sleep thoughts and the emotional reaction they produce.     2. A second and equally important step is to identify these thoughts without any judgment. It is important that you accept your thoughts as they are before you try to reframe and change them.     3.  The next step is to replace or reframe the negative thoughts related to sleep with more accurate and positive thoughts.  To facilitate this process, we have provided a list of positive, accurate thoughts to gradually incorporate into your beliefs about your sleep.  These positive thoughts are based on sleep research.  Read over these positive thoughts and choose those that seem to best reflect your situation.  We also encourage you to generate your own positive and accurate about your sleep.  Whenever you notice a negative thought enter you mind, replace it with one of your positive thoughts.  Rehearse these positive thoughts each day.  By practicing cognitive restructuring each day, you will notice that your frame of mind will shift leading to a much more positive attitude about your sleep.      Examples of Helpful Sleep Thoughts     My work will not suffer significantly if I have a poor night's sleep.      I m probably getting more sleep than I think.  My daytime functioning is not affected only by my sleep.      Since I didn t sleep well last night, I am more likely to sleep well tonight due  to a biological pressure  to recover my sleep loss.      Sleep requirements vary from person to person.      In most cases, the worst thing that can happen if I don t sleep well is that my mood might not be as bright the next day.      If I awaken after about five and a half hours of sleep, I have gotten all the sleep I really need.      I m more likely to fall asleep as my body temperature falls throughout the night.      It is normal to initially feel alert if I awaken at the beginning or end of a dream; drowsiness will soon follow.      My functioning will improve during the day as my body temperature rises.      My sleep will be improving as I learn and apply the behavioral techniques I am taught.      These techniques have worked for others, and they will work for me.     Other Recommendations for Changing  Beliefs and Attitudes About Your Sleep    1. Keep Realistic Expectations:  There is a widespread belief that 8 hours of sleep is necessary to feel refreshed and function well during the day. Some believe that  good sleep  means never waking up at night.  Others come to expect that they should always wake up in the morning feeling completely rested and full of energy.  Concerns and worries may arise when such expectations are not met.  You may believe that you need this much sleep but the fact remains that there are individual differences in sleep requirements.  Some require as little as 4-5 hours whereas others require 10.  Thus, there is no  gold standard  for adequate sleep duration.  To overcome insomnia, you must keep your expectations realistic and understand that some days you may not sleep as well as you desire.  Average normal sleepers experience two nights of disrupted sleep per week. There is no reason to be alarmed by a few nights of poor sleep.  To overcome insomnia you need to avoid placing undue pressure on yourself to achieve certain sleep standards as this is likely to increase performance anxiety and perpetuate your  sleep problem.     2. Revise Attributions about the Causes of Insomnia:  There is a natural tendency to attribute   one s sleep problem to external factors (e.g., chemical imbalance, pain, and aging), over which     a person may have little control.  Although these factors may contribute to your sleep disturbance, there are many things you can do to offset the effects of such factors including changing bad sleep habits and unhelpful beliefs about sleep.    3. Don t Blame Insomnia for All Daytime Impairments:  Many individuals blame insomnia for   everything that goes wrong during the day--fatigue, irritability, and concentration problems.  Although poor sleep may indeed produce some of these consequences, the exclusive attribution of all daytime impairments to insomnia is erroneous and, ultimately, perpetuates the insomnia course.  So ask yourself the following question prior to blaming insomnia for a particular impairment:   Could something else be bothering me and affecting my daytime functioning?      4. Don t Catastrophize after a Poor Night s Sleep:  Sometimes worrying turns into catastrophic  thinking.  Some patients are concerned that insomnia may have serious consequences on their health, others believe poor sleep will deteriorate their physical appearance, and still others see insomnia as an indication of complete loss of control in their lives.  Quite frankly, it is often these types of concerns about the perceived consequences of insomnia, rather than the sleep problem itself, which prompt patients to seek treatment.  Keep in mind that insomnia can be very unpleasant but it is not necessarily dangerous.    5. Don t Place Too Much Emphasis on Sleep:  Some individual significantly reduce their activity level because of poor sleep and lack of energy.  Although sleep occupies nearly one third of our lives, and is a necessary part of our life, don t give it more importance than it deserves.  Make sure you  continue to engage in your normal activities despite your insomnia.          6. Develop some Tolerance to the Effects of Sleep Loss:  Instead of  dwelling on insomnia and its negative effects on daytime functioning, a  more productive approach is developing some tolerance to sleep loss.   Encourage yourself to go on with your daily routine and activities            even after a poor night s sleep.  A useful experiment is scheduling a  pleasant activity after a poor night s sleep.  You may learn that sleepiness  alone does not prevent you from doing things you enjoy.    6. Never Try to Sleep:  If we could highlight one cardinal cognitive-behavioral  rule of sleep it would be  Never Try to Force Yourself to Sleep  because sleep cannot be achieved on command.  Whenever a person tries too hard to control or to accomplish anything, it backfires and actually impairs performance producing classic performance anxiety.  Instead, change whatever unhelpful habits you have that interfere with your biological sleep system, practice relaxation, and allow sleep to come about naturally!  If you find yourself frequently trying too hard to fall asleep, you may wish to try to stay awake instead of trying to fall asleep. You will find this cannot be sustained!     1. EDWIN Vail  (1998) Say Monserrat to Insomnia. [employer]: [place]      Followup in 4 weeks    Keep sleep diary.    I will email your physician about a taper schedule which we will begin if approved in four weeks.    The schedule will be reducing your current 45 mg in one week intervals to 30 mg., 15 mg., 7.5 mg, 0 mg.                      Follow-ups after your visit        Your next 10 appointments already scheduled     Feb 16, 2018 12:30 PM CST   Return Sleep Patient with Simeon Moore PsyD   [facility] ([facility])    [address] [place] [id number] [phone]             "  Who to contact     If you have questions or need follow up information about today's clinic visit or your schedule please contact [facility] directly at [phone].  Normal or non-critical lab and imaging results will be communicated to you by MyChart, letter or phone within 4 business days after the clinic has received the results. If you do not hear from us within 7 days, please contact the clinic through MyChart or phone. If you have a critical or abnormal lab result, we will notify you by phone as soon as possible.  Submit refill requests through [facility] or call your pharmacy and they will forward the refill request to us. Please allow 3 business days for your refill to be completed.          Additional Information About Your Visit        [facility] Information     [facility] lets you send messages to your doctor, view your test results, renew your prescriptions, schedule appointments and more. To sign up, go to [email or web address] . Click on \"Log in\" on the left side of the screen, which will take you to the Welcome page. Then click on \"Sign up Now\" on the right side of the page.     You will be asked to enter the access code listed below, as well as some personal information. Please follow the directions to create your username and password.     Your access code is: [email or web address]  Expires: 3/20/2018 10:36 AM     Your access code will  in 90 days. If you need help or a new code, please call your [facility] clinic or [phone].        Care EveryWhere ID     This is your Care EveryWhere ID. This could be used by other organizations to access your [facility] medical records  [id number]        Your Vitals Were     Pulse Height Pulse Oximetry BMI (Body Mass Index)          85 1.753 m (5' 9\") 100% 21.41 kg/m2         Blood Pressure from Last 3 Encounters:   18 127/75   17 98/69   02/15/17 147/86    Weight from Last 3 Encounters:   18 65.8 kg (145 lb)   17 " 65.9 kg (145 lb 3.2 oz)              Today, you had the following     No orders found for display       Primary Care Provider    David Connolly MD       XXX RETIRED XXX XXX  XXX [place] [address]        Equal Access to Services     AISHA STEVENRANJEET : Hadii aad ku hadricho Soomaali, waaxda luqadaha, qaybta kaalmada adeegyada, jennifer mccallumclare snyder. So [facility] [phone].    ATENCIÓN: Si habla español, tiene a cintron disposición servicios gratuitos de asistencia lingüística. Llame al [phone].    We comply with applicable federal civil rights laws and [place] laws. We do not discriminate on the basis of race, color, national origin, age, disability, sex, sexual orientation, or gender identity.            Thank you!     Thank you for choosing [facility]  for your care. Our goal is always to provide you with excellent care. Hearing back from our patients is one way we can continue to improve our services. Please take a few minutes to complete the written survey that you may receive in the mail after your visit with us. Thank you!             Your Updated Medication List - Protect others around you: Learn how to safely use, store and throw away your medicines at www.disposemymeds.org.          This list is accurate as of: 1/12/18  3:12 PM.  Always use your most recent med list.                   Brand Name Dispense Instructions for use Diagnosis    fluocinolone 0.025 % ointment    SYNALAR    15 g    Apply twice daily to lips for 2-3 weeks.        LASIX PO      Take by mouth daily        levothyroxine 100 MCG tablet    SYNTHROID/LEVOTHROID     Take 100 mcg by mouth daily        LEXAPRO PO      Take 10 mg by mouth        POTASSIUM CITRATE PO      Take 20 mEq by mouth        spironolactone 50 MG tablet    ALDACTONE    90 tablet    Take 100 mg (2 pills) in the morning and 50 mg in the evening (1 pill).    Acne vulgaris       TEMAZEPAM PO      Take by mouth At Bedtime        tretinoin 0.1 %  cream    RETIN-A    20 g    Apply a pea sized amount at bedtime    Acne vulgaris

## 2018-01-13 ASSESSMENT — ANXIETY QUESTIONNAIRES: GAD7 TOTAL SCORE: 13

## 2018-01-15 NOTE — PROGRESS NOTES
SLEEP MEDICINE CONSULTATION  Sleep Psychology    Name: Lolly Azevedo MRN# [id number]   Age: 52 year old YOB: 1965     Date of Consultation: Jan 12, 2018  Consultation is requested by: Emmanuel Albert MD  [id number] [place] [address] [place] [id number]  Primary care provider: David Connolly (Inactive)    Reason for Sleep Consultation     Lolly Azevedo is a 52 year old female seen today for a behavioral sleep medicine consultation because of chronic insomnia.      Assessment and Plan     Sleep Diagnoses/Recommendations:    1. 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 She is a suitable candidate for cognitive behavioral therapy for insomnia.She has a target goal of reducing or eliminating use ofSleep medication, particularly benzodiazepines and sleeping better with a nonpharmacological approach.  Patient is anxious about tapering her Temazepam currently at 45 mg.She was advised to confer with [facility] primary care physician about a taper and I agreed toReach out to her physician with the proposed taper plan. Cognitive behavioral therapy for insomnia will include sleep compression, stimulus control therapy, cognitive restructuring to address sleep anxiety and sleep hygiene training.  CBTi is considered the gold standard for treatment of insomnia and is recommended by the [employer] as the first line treatment for insomnia.    2.  Persistent depressive disorder with anxiety (dysthymic disorder) -   Patient reports she is currently benefiting from escitalopram and Will continue to follow with her primary care physician.    Summary Counseling:      Lolly was provided information about the pathophysiology of insomnia and psychophysiological factors contributing to the onset and  maintenance of insomnia .  Treatment option were discussed including component of cognitive-behavioral therapy for insomnia. The benefits and potential early side effects of treatment including increased daytime sleepiness were discussed. Patient was counseled on the importance of avoiding driving if drowsy.    See patient instructions for initial treatment recommendations and behavioral sleep plan.    Follow-up: 4 weeks     History of Present Sleep Complaint     Lolly Azevedo is a 52 year old year old female with a a history of Adjustment disorder with mixed anxiety and depressed mood which she associates with significant work stressors.Symptoms have been ersistent time and patient was introduced to antidepressant therapy currently escitalopram.  Patient states that she cannot fall asleep without medication But is seeking a non-pharmacological treatment.She has tried zolpidem which she states was not helpful, trazodone and has been on long-term temazepam therapy with dose escalation to a current 45 mg over the past couple of years. He recently consultation by Kaushal Albert MD Resulted in a referral for Cognitive behavioral therapy for insomnia.  My colleague's history of clinical presentation is summarized below and remains current.  Patient did complete a two week sleep diary prior to this evaluation reflecting that she takes her sleep medication between 1030-11 PM and that she gets out of bed by about 6 am except on weekends when she will sleep in about two hours later.  There appears to be prominent middle of the night awakening of up to two hours or more and early morning waking as well.  Sleep latency with medication is about 15 minutes.    Patient feels she is sleeping somewhat better with escitalopram.  She is concerned about upcoming work travel with [place] time change of 2-3 hours and some oversees travel to [place].    There is prominent sleeps specific anxiety and worry about not sleeping and the  "psychological exertion/effort to sleep better.Considerable maladaptive thoughts and beliefs about sleep and insomnia.    Prior Sleep Consultation:    Emmanuel Albert MD, [facility] [place], 12/28/17 history below:    Lolly Azevedo is a 52 year old  RHD female pt with PMH of adjustment disorder with mixed anxiety and depressed mood, anxiety disorder, chronic cough, constipation, secondary hypothyroidism, insomnia, migraine headaches, and acne presents for an initial evaluation today due to possible insomnia. The patient explains that she cannot fall asleep or stay asleep throughout the night. Pt explains that she never had difficulties sleeping until 2 years ago, when she was undergoing a lot of work related stress. Due to this stress, the patient took one month off from work. Due to the stress, the patient developed difficulty sleeping through the night. The patient is currently going to bed at 1030 PM and she falls sleep, most of the time, within 30 minutes (as long as she uses her temazepam 3 mg). Once she falls asleep, she wakes up at ~330 AM due to unknown reasons and she is unable to fall back asleep. Pt reports stress around sleeping habits and she also endorses racing and intrusive thoughts. When she wakes up during the night, the patient stays in bed until the alarm goes off. The patient was evaluated in the past by a sleep clinic and she was referred to a sleep psychologist for CBTI. This was unsuccessful because the psychologist \"advised things that did not work well with her lifestyle.\" These poor sleeping patterns are affecting her social habits and daytime activities. Pt also reports daytime tiredness with mental fogginess. The patient explains that she is experiencing insomnia each night. The pt tried in the past zolpidem, trazodone, eszopiclone, diphenhydramine, melatonin, and many other medications with no much improvement. Currently, the patient is using temazepam 3 mg (this " "was slowly increased to this current dose). Pt denies any RLS, SI/HI, parasomnias, snoring, gasping / choking for air, witnessed apneas, or any other sxs or concern. The patient voices that ideally she would like to stop using any sleeping aids.\"            Substance Use:    Tobacco: None    Caffeine: One or two  cups/day of coffee. Last caffeine intake is usually before 2 PM.   Alcohol: One to 2 alcoholic beverages several days per week, Often close to bedtime   Recreational Drugs: None reported       Screening          Jennings Sleepiness Scale  Total score - Jennings: 2 .            PHQ-9 SCORE 1/12/2018   Total Score 9       MILAD-7 SCORE 1/12/2018   Total Score 13       Patient Activation Score   No flowsheet data found.      Vitals     /75  Pulse 85  Ht 1.753 m (5' 9\")  Wt 65.8 kg (145 lb)  SpO2 100%  BMI 21.41 kg/m2     Medical History     No past medical history on file.    Current Outpatient Prescriptions   Medication Sig Dispense Refill     Escitalopram Oxalate (LEXAPRO PO) Take 10 mg by mouth       spironolactone (ALDACTONE) 50 MG tablet Take 100 mg (2 pills) in the morning and 50 mg in the evening (1 pill). 90 tablet 5     levothyroxine (SYNTHROID/LEVOTHROID) 100 MCG tablet Take 100 mcg by mouth daily       POTASSIUM CITRATE PO Take 20 mEq by mouth       Furosemide (LASIX PO) Take by mouth daily       TEMAZEPAM PO Take by mouth At Bedtime       fluocinolone (SYNALAR) 0.025 % ointment Apply twice daily to lips for 2-3 weeks. 15 g 0     tretinoin (RETIN-A) 0.1 % cream Apply a pea sized amount at bedtime 20 g 3       No past surgical history on file.       Allergies   Allergen Reactions     Bupropion Other (See Comments)     Diatrizoate Hives       Old dye had a rash.. Was premedicated and did fine with omnipaque 350 Deidre Correa ....................  9/17/2014   9:55 AM     Ivp Dye [Contrast Dye]      Zolpidem Other (See Comments)     Sulfa Drugs Rash         Psychiatric History     Prior " Psychiatric Diagnoses: yes, Medical record indicates prior diagnoses of adjustment disorder with mixed anxiety and depressed mood, major depressive disorder   Psychiatric Hospitalizations: none   Use of Psychotropics yes, escitalopram      Chemical Use     Prior Chemical Dependency Treatment: none         Family History     Family History   Problem Relation Age of Onset     CANCER Mother      skin cancer       Sleep Disorders: None reported    Social History     Social History     Social History     Marital status:      Spouse name: N/A     Number of children: N/A     Years of education: N/A     Social History Main Topics     Smoking status: Never Smoker     Smokeless tobacco: Not on file     Alcohol use Not on file     Drug use: Not on file     Sexual activity: Not on file     Other Topics Concern     Not on file     Social History Narrative     No narrative on file       , works full-time     Mental Status Examination     Lolly presented as appropriately dressed and groomed and was oriented X3 with speech language intact.  The patient was cooperative throughout the evaluation with no signs of hallucinations, delusions, loosening of associations or other thought disturbance.  Mood was Stable.  Affect was congruent with mood. Insight and judgement we intact.  Memory was intact for recent and remote elements.  There was no report of suicidal ideation, intention or plan. Attention and concentration were within normal.      Time Spent:  55 minutes    Copy:   David Connolly (Inactive)               Emmanuel Albert MD  [address]  [address] [address]    Simeon Moore PsyD, LP, CBSM  Certified, Behavioral Sleep Medicine  [facility]  [place] [facility] [place]

## 2018-02-16 ENCOUNTER — OFFICE VISIT (OUTPATIENT)
Dept: SLEEP MEDICINE | Facility: CLINIC | Age: 53
End: 2018-02-16
Payer: COMMERCIAL

## 2018-02-16 VITALS
OXYGEN SATURATION: 100 % | HEART RATE: 67 BPM | WEIGHT: 143 LBS | BODY MASS INDEX: 21.18 KG/M2 | DIASTOLIC BLOOD PRESSURE: 77 MMHG | HEIGHT: 69 IN | SYSTOLIC BLOOD PRESSURE: 113 MMHG

## 2018-02-16 DIAGNOSIS — F51.04 PSYCHOPHYSIOLOGICAL INSOMNIA: Primary | ICD-10-CM

## 2018-02-16 PROCEDURE — 90834 PSYTX W PT 45 MINUTES: CPT | Performed by: PSYCHOLOGIST

## 2018-02-16 NOTE — NURSING NOTE
"  Your BMI is Body mass index is 21.27 kg/(m^2).  Weight management is a personal decision.  If you are interested in exploring weight loss strategies, the following discussion covers the approaches that may be successful. Body mass index (BMI) is one way to tell whether you are at a healthy weight, overweight, or obese. It measures your weight in relation to your height.  A BMI of 18.5 to 24.9 is in the healthy range. A person with a BMI of 25 to 29.9 is considered overweight, and someone with a BMI of 30 or greater is considered obese. More than two-thirds of American adults are considered overweight or obese.  Being overweight or obese increases the risk for further weight gain. Excess weight may lead to heart disease and diabetes.  Creating and following plans for healthy eating and physical activity may help you improve your health.  Weight control is part of healthy lifestyle and includes exercise, emotional health, and healthy eating habits. Careful eating habits lifelong are the mainstay of weight control. Though there are significant health benefits from weight loss, long-term weight loss with diet alone may be very difficult to achieve- studies show long-term success with dietary management in less than 10% of people. Attaining a healthy weight may be especially difficult to achieve in those with severe obesity. In some cases, medications, devices and surgical management might be considered.  What can you do?  If you are overweight or obese and are interested in methods for weight loss, you should discuss this with your provider.     Consider reducing daily calorie intake by 500 calories.     Keep a food journal.     Avoiding skipping meals, consider cutting portions instead.  Chief Complaint   Patient presents with     Sleep Problem       Initial /77  Pulse 67  Ht 1.746 m (5' 8.75\")  Wt 64.9 kg (143 lb)  SpO2 100%  BMI 21.27 kg/m2 Estimated body mass index is 21.27 kg/(m^2) as calculated from " "the following:    Height as of this encounter: 1.746 m (5' 8.75\").    Weight as of this encounter: 64.9 kg (143 lb).  Medication Reconciliation: complete        "

## 2018-02-16 NOTE — PATIENT INSTRUCTIONS
Reviewing your sleep diaries you are making good progress.  I would focus on trying to be more consistent in your wake time.  As you decrease your medication under the care of you physician, remember that you may start feeling a bit more awake at night for a while but focus on how you feel during the day, not the night.  You may be correct that your morning tiredness is really medication side effects.  I would followup with your physician about this as well.    You may benefit from reviewing the information we previously discuss about beliefs and worries about sleep.  In addition there is a good book called GOOD NIGHT MIND by Julia Doyle that I think you would benefit from reading.    STRATEGIES FOR DEVELOPING A HEALTHY ATTITUDE ABOUT SLEEP    Insomnia is often is triggered by stressful events such as a change in employment, a separation, medical illness, or loss.  Your response to your sleep problem, mainly your thoughts and behaviors, determines in large part whether the sleep disturbance will resolve or develop a chronic course well after the stressful event is over.  Insomnia is more likely to persist over time if a person interprets this situational insomnia as a sign of danger or loss of control - and because of this begins to monitor sleep loss and worry about its consequences.  Unhealthy worry, fears and beliefs about insomnia can become a vicious cycle of emotional distress and increased sleep disturbance.  Negative beliefs about sleep produce increasing stress and pressure to sleep.  We call this unhelpful sleep effort. The following are strategies for changing beliefs and attitudes about sleep than may be contributing to your continued insomnia.    Changing How You Think About Insomnia      You may be surprised to learn that prior to the 1900 s, most medicines given by physicians were worthless.  Yet, remarkably, patients still improved.  How was this possible?  It was due to the patient s belief in  the physician and the medicine, which mobilized strong self-healing mechanisms.  Today, this effect has been referred to as the placebo effect and it is recognized as a significant component of all medical treatments. Recent research has challenged the traditional separation of the mind and body in medicine. The placebo effect is a powerful reminder that that how we think and what we believe has a profound impact on our brain and body.  We now know that by managing stress and thinking more positively we can improve our health and promote healing (1).  Negative sleep thoughts in particular can have a profoundly adverse effect on your insomnia. They can lead to elevated fear or anxiety about sleep which in turn can aggravate your sleep problem.    Examples of Unhelpful Sleep Thoughts    Unhelpful thoughts about sleep can have a profound impact on your ability to get a good night s sleep. Below are some examples of negative thoughts associated with insomnia that may sound familiar to you:     I must get 8 hours of sleep to function during the day      Insomnia is going to ruin my health      I have lost control of my sleep      I m dreading bedtime      I didn t sleep at all last night and know I won t sleep tomorrow night either      I can t sleep without a sleeping pill       These types of thoughts are often based on fear and myths about how sleep works. They produce feelings of anxiety and fear, emotions that fuel the stress response casing increased heart rate and brain wave activity.  This in turn activates your brain s wakefulness system and weakens your natural sleep drive resulting in increased difficulty falling and staying asleep.      Negative thoughts usually occur automatically and feel like a knee-jerk reaction.  They are often inaccurate and distorted, especially late at night as you become increasingly tired, it is dark, quiet, and others are asleep.      Cognitive Restructuring: Changing Unhelpful  Sleep Thoughts    Now that you understand the impact that negative thoughts can have on your sleep, you are ready to change these unhelpful thoughts using cognitive restructuring.  The process - called cognitive restructuring - is powerfully simple:  By recognizing and replacing your negative thoughts about sleep with more accurate, positive thoughts you will be less anxious and frustrated.  You will stop working so hard to sleep.  As a result, you will be able to relax and more easily sleep through the night.    There are several important steps involved in changing your unhelpful and negative thoughts about sleep:    1. The most important first step is recognizing and identifying your unhelpful sleep thoughts and the emotional reaction they produce.     2. A second and equally important step is to identify these thoughts without any judgment. It is important that you accept your thoughts as they are before you try to reframe and change them.     3.  The next step is to replace or reframe the negative thoughts related to sleep with more accurate and positive thoughts.  To facilitate this process, we have provided a list of positive, accurate thoughts to gradually incorporate into your beliefs about your sleep.  These positive thoughts are based on sleep research.  Read over these positive thoughts and choose those that seem to best reflect your situation.  We also encourage you to generate your own positive and accurate about your sleep.  Whenever you notice a negative thought enter you mind, replace it with one of your positive thoughts.  Rehearse these positive thoughts each day.  By practicing cognitive restructuring each day, you will notice that your frame of mind will shift leading to a much more positive attitude about your sleep.      Examples of Helpful Sleep Thoughts     My work will not suffer significantly if I have a poor night's sleep.      I m probably getting more sleep than I think.  My daytime  functioning is not affected only by my sleep.      Since I didn t sleep well last night, I am more likely to sleep well tonight due  to a biological pressure to recover my sleep loss.      Sleep requirements vary from person to person.      In most cases, the worst thing that can happen if I don t sleep well is that my mood might not be as bright the next day.      If I awaken after about five and a half hours of sleep, I have gotten all the sleep I really need.      I m more likely to fall asleep as my body temperature falls throughout the night.      It is normal to initially feel alert if I awaken at the beginning or end of a dream; drowsiness will soon follow.      My functioning will improve during the day as my body temperature rises.      My sleep will be improving as I learn and apply the behavioral techniques I am taught.      These techniques have worked for others, and they will work for me.     Other Recommendations for Changing  Beliefs and Attitudes About Your Sleep    1. Keep Realistic Expectations:  There is a widespread belief that 8 hours of sleep is necessary to feel refreshed and function well during the day. Some believe that  good sleep  means never waking up at night.  Others come to expect that they should always wake up in the morning feeling completely rested and full of energy.  Concerns and worries may arise when such expectations are not met.  You may believe that you need this much sleep but the fact remains that there are individual differences in sleep requirements.  Some require as little as 4-5 hours whereas others require 10.  Thus, there is no  gold standard  for adequate sleep duration.  To overcome insomnia, you must keep your expectations realistic and understand that some days you may not sleep as well as you desire.  Average normal sleepers experience two nights of disrupted sleep per week. There is no reason to be alarmed by a few nights of poor sleep.  To overcome insomnia  you need to avoid placing undue pressure on yourself to achieve certain sleep standards as this is likely to increase performance anxiety and perpetuate your sleep problem.     2. Revise Attributions about the Causes of Insomnia:  There is a natural tendency to attribute   one s sleep problem to external factors (e.g., chemical imbalance, pain, and aging), over which     a person may have little control.  Although these factors may contribute to your sleep disturbance, there are many things you can do to offset the effects of such factors including changing bad sleep habits and unhelpful beliefs about sleep.    3. Don t Blame Insomnia for All Daytime Impairments:  Many individuals blame insomnia for   everything that goes wrong during the day--fatigue, irritability, and concentration problems.  Although poor sleep may indeed produce some of these consequences, the exclusive attribution of all daytime impairments to insomnia is erroneous and, ultimately, perpetuates the insomnia course.  So ask yourself the following question prior to blaming insomnia for a particular impairment:   Could something else be bothering me and affecting my daytime functioning?      4. Don t Catastrophize after a Poor Night s Sleep:  Sometimes worrying turns into catastrophic  thinking.  Some patients are concerned that insomnia may have serious consequences on their health, others believe poor sleep will deteriorate their physical appearance, and still others see insomnia as an indication of complete loss of control in their lives.  Quite frankly, it is often these types of concerns about the perceived consequences of insomnia, rather than the sleep problem itself, which prompt patients to seek treatment.  Keep in mind that insomnia can be very unpleasant but it is not necessarily dangerous.    5. Don t Place Too Much Emphasis on Sleep:  Some individual significantly reduce their activity level because of poor sleep and lack of energy.   Although sleep occupies nearly one third of our lives, and is a necessary part of our life, don t give it more importance than it deserves.  Make sure you continue to engage in your normal activities despite your insomnia.          6. Develop some Tolerance to the Effects of Sleep Loss:  Instead of  dwelling on insomnia and its negative effects on daytime functioning, a  more productive approach is developing some tolerance to sleep loss.   Encourage yourself to go on with your daily routine and activities            even after a poor night s sleep.  A useful experiment is scheduling a  pleasant activity after a poor night s sleep.  You may learn that sleepiness  alone does not prevent you from doing things you enjoy.    6. Never Try to Sleep:  If we could highlight one cardinal cognitive-behavioral  rule of sleep it would be  Never Try to Force Yourself to Sleep  because sleep cannot be achieved on command.  Whenever a person tries too hard to control or to accomplish anything, it backfires and actually impairs performance producing classic performance anxiety.  Instead, change whatever unhelpful habits you have that interfere with your biological sleep system, practice relaxation, and allow sleep to come about naturally!  If you find yourself frequently trying too hard to fall asleep, you may wish to try to stay awake instead of trying to fall asleep. You will find this cannot be sustained!         1. EDWIN Vail  (1998) Say [place]err[place] to Insomnia. [employer]: [place]      Followup in 3-4 weeks.        Your BMI is Body mass index is 21.27 kg/(m^2).  Weight management is a personal decision.  If you are interested in exploring weight loss strategies, the following discussion covers the approaches that may be successful. Body mass index (BMI) is one way to tell whether you are at a healthy weight, overweight, or obese. It measures your weight in relation to your height.  A BMI of 18.5 to 24.9 is in the healthy  range. A person with a BMI of 25 to 29.9 is considered overweight, and someone with a BMI of 30 or greater is considered obese. More than two-thirds of [place] adults are considered overweight or obese.  Being overweight or obese increases the risk for further weight gain. Excess weight may lead to heart disease and diabetes.  Creating and following plans for healthy eating and physical activity may help you improve your health.  Weight control is part of healthy lifestyle and includes exercise, emotional health, and healthy eating habits. Careful eating habits lifelong are the mainstay of weight control. Though there are significant health benefits from weight loss, long-term weight loss with diet alone may be very difficult to achieve- studies show long-term success with dietary management in less than 10% of people. Attaining a healthy weight may be especially difficult to achieve in those with severe obesity. In some cases, medications, devices and surgical management might be considered.  What can you do?  If you are overweight or obese and are interested in methods for weight loss, you should discuss this with your provider.     Consider reducing daily calorie intake by 500 calories.     Keep a food journal.     Avoiding skipping meals, consider cutting portions instead.    Diet combined with exercise helps maintain muscle while optimizing fat loss. Strength training is particularly important for building and maintaining muscle mass. Exercise helps reduce stress, increase energy, and improves fitness. Increasing exercise without diet control, however, may not burn enough calories to loose weight.       Start walking three days a week 10-20 minutes at a time    Work towards walking thirty minutes five days a week     Eventually, increase the speed of your walking for 1-2 minutes at time    In addition, we recommend that you review healthy lifestyles and methods for weight loss available through the National  [employer]stitutes of [employer] patient information sites:  http://win.niddk.nih.gov/publications/index.htm    And look into health and wellness programs that may be available through your health insurance provider, employer, local community center, or violet club.

## 2018-02-16 NOTE — MR AVS SNAPSHOT
After Visit Summary   2/16/2018    Lolly Azevedo    MRN: [id number]           Patient Information     Date Of Birth          1965        Visit Information        Provider Department      2/16/2018 12:30 PM Simeon Moore PsyD [facility] [place]        Care Instructions    Reviewing your sleep diaries you are making good progress.  I would focus on trying to be more consistent in your wake time.  As you decrease your medication under the care of you physician, remember that you may start feeling a bit more awake at night for a while but focus on how you feel during the day, not the night.  You may be correct that your morning tiredness is really medication side effects.  I would followup with your physician about this as well.    You may benefit from reviewing the information we previously discuss about beliefs and worries about sleep.  In addition there is a good book called GOOD NIGHT MIND by Julia Doyle that I think you would benefit from reading.    STRATEGIES FOR DEVELOPING A HEALTHY ATTITUDE ABOUT SLEEP    Insomnia is often is triggered by stressful events such as a change in employment, a separation, medical illness, or loss.  Your response to your sleep problem, mainly your thoughts and behaviors, determines in large part whether the sleep disturbance will resolve or develop a chronic course well after the stressful event is over.  Insomnia is more likely to persist over time if a person interprets this situational insomnia as a sign of danger or loss of control - and because of this begins to monitor sleep loss and worry about its consequences.  Unhealthy worry, fears and beliefs about insomnia can become a vicious cycle of emotional distress and increased sleep disturbance.  Negative beliefs about sleep produce increasing stress and pressure to sleep.  We call this unhelpful sleep effort. The following are strategies for changing beliefs and attitudes about  sleep than may be contributing to your continued insomnia.    Changing How You Think About Insomnia      You may be surprised to learn that prior to the 1900 s, most medicines given by physicians were worthless.  Yet, remarkably, patients still improved.  How was this possible?  It was due to the patient s belief in the physician and the medicine, which mobilized strong self-healing mechanisms.  Today, this effect has been referred to as the placebo effect and it is recognized as a significant component of all medical treatments. Recent research has challenged the traditional separation of the mind and body in medicine. The placebo effect is a powerful reminder that that how we think and what we believe has a profound impact on our brain and body.  We now know that by managing stress and thinking more positively we can improve our health and promote healing (1).  Negative sleep thoughts in particular can have a profoundly adverse effect on your insomnia. They can lead to elevated fear or anxiety about sleep which in turn can aggravate your sleep problem.    Examples of Unhelpful Sleep Thoughts    Unhelpful thoughts about sleep can have a profound impact on your ability to get a good night s sleep. Below are some examples of negative thoughts associated with insomnia that may sound familiar to you:     I must get 8 hours of sleep to function during the day      Insomnia is going to ruin my health      I have lost control of my sleep      I m dreading bedtime      I didn t sleep at all last night and know I won t sleep tomorrow night either      I can t sleep without a sleeping pill       These types of thoughts are often based on fear and myths about how sleep works. They produce feelings of anxiety and fear, emotions that fuel the stress response casing increased heart rate and brain wave activity.  This in turn activates your brain s wakefulness system and weakens your natural sleep drive resulting in increased  difficulty falling and staying asleep.      Negative thoughts usually occur automatically and feel like a knee-jerk reaction.  They are often inaccurate and distorted, especially late at night as you become increasingly tired, it is dark, quiet, and others are asleep.      Cognitive Restructuring: Changing Unhelpful Sleep Thoughts    Now that you understand the impact that negative thoughts can have on your sleep, you are ready to change these unhelpful thoughts using cognitive restructuring.  The process - called cognitive restructuring - is powerfully simple:  By recognizing and replacing your negative thoughts about sleep with more accurate, positive thoughts you will be less anxious and frustrated.  You will stop working so hard to sleep.  As a result, you will be able to relax and more easily sleep through the night.    There are several important steps involved in changing your unhelpful and negative thoughts about sleep:    1. The most important first step is recognizing and identifying your unhelpful sleep thoughts and the emotional reaction they produce.     2. A second and equally important step is to identify these thoughts without any judgment. It is important that you accept your thoughts as they are before you try to reframe and change them.     3.  The next step is to replace or reframe the negative thoughts related to sleep with more accurate and positive thoughts.  To facilitate this process, we have provided a list of positive, accurate thoughts to gradually incorporate into your beliefs about your sleep.  These positive thoughts are based on sleep research.  Read over these positive thoughts and choose those that seem to best reflect your situation.  We also encourage you to generate your own positive and accurate about your sleep.  Whenever you notice a negative thought enter you mind, replace it with one of your positive thoughts.  Rehearse these positive thoughts each day.  By practicing  cognitive restructuring each day, you will notice that your frame of mind will shift leading to a much more positive attitude about your sleep.      Examples of Helpful Sleep Thoughts     My work will not suffer significantly if I have a poor night's sleep.      I m probably getting more sleep than I think.  My daytime functioning is not affected only by my sleep.      Since I didn t sleep well last night, I am more likely to sleep well tonight due  to a biological pressure to recover my sleep loss.      Sleep requirements vary from person to person.      In most cases, the worst thing that can happen if I don t sleep well is that my mood might not be as bright the next day.      If I awaken after about five and a half hours of sleep, I have gotten all the sleep I really need.      I m more likely to fall asleep as my body temperature falls throughout the night.      It is normal to initially feel alert if I awaken at the beginning or end of a dream; drowsiness will soon follow.      My functioning will improve during the day as my body temperature rises.      My sleep will be improving as I learn and apply the behavioral techniques I am taught.      These techniques have worked for others, and they will work for me.     Other Recommendations for Changing  Beliefs and Attitudes About Your Sleep    1. Keep Realistic Expectations:  There is a widespread belief that 8 hours of sleep is necessary to feel refreshed and function well during the day. Some believe that  good sleep  means never waking up at night.  Others come to expect that they should always wake up in the morning feeling completely rested and full of energy.  Concerns and worries may arise when such expectations are not met.  You may believe that you need this much sleep but the fact remains that there are individual differences in sleep requirements.  Some require as little as 4-5 hours whereas others require 10.  Thus, there is no  gold standard  for  adequate sleep duration.  To overcome insomnia, you must keep your expectations realistic and understand that some days you may not sleep as well as you desire.  Average normal sleepers experience two nights of disrupted sleep per week. There is no reason to be alarmed by a few nights of poor sleep.  To overcome insomnia you need to avoid placing undue pressure on yourself to achieve certain sleep standards as this is likely to increase performance anxiety and perpetuate your sleep problem.     2. Revise Attributions about the Causes of Insomnia:  There is a natural tendency to attribute   one s sleep problem to external factors (e.g., chemical imbalance, pain, and aging), over which     a person may have little control.  Although these factors may contribute to your sleep disturbance, there are many things you can do to offset the effects of such factors including changing bad sleep habits and unhelpful beliefs about sleep.    3. Don t Blame Insomnia for All Daytime Impairments:  Many individuals blame insomnia for   everything that goes wrong during the day--fatigue, irritability, and concentration problems.  Although poor sleep may indeed produce some of these consequences, the exclusive attribution of all daytime impairments to insomnia is erroneous and, ultimately, perpetuates the insomnia course.  So ask yourself the following question prior to blaming insomnia for a particular impairment:   Could something else be bothering me and affecting my daytime functioning?      4. Don t Catastrophize after a Poor Night s Sleep:  Sometimes worrying turns into catastrophic  thinking.  Some patients are concerned that insomnia may have serious consequences on their health, others believe poor sleep will deteriorate their physical appearance, and still others see insomnia as an indication of complete loss of control in their lives.  Quite frankly, it is often these types of concerns about the perceived consequences of  insomnia, rather than the sleep problem itself, which prompt patients to seek treatment.  Keep in mind that insomnia can be very unpleasant but it is not necessarily dangerous.    5. Don t Place Too Much Emphasis on Sleep:  Some individual significantly reduce their activity level because of poor sleep and lack of energy.  Although sleep occupies nearly one third of our lives, and is a necessary part of our life, don t give it more importance than it deserves.  Make sure you continue to engage in your normal activities despite your insomnia.          6. Develop some Tolerance to the Effects of Sleep Loss:  Instead of  dwelling on insomnia and its negative effects on daytime functioning, a  more productive approach is developing some tolerance to sleep loss.   Encourage yourself to go on with your daily routine and activities            even after a poor night s sleep.  A useful experiment is scheduling a  pleasant activity after a poor night s sleep.  You may learn that sleepiness  alone does not prevent you from doing things you enjoy.    6. Never Try to Sleep:  If we could highlight one cardinal cognitive-behavioral  rule of sleep it would be  Never Try to Force Yourself to Sleep  because sleep cannot be achieved on command.  Whenever a person tries too hard to control or to accomplish anything, it backfires and actually impairs performance producing classic performance anxiety.  Instead, change whatever unhelpful habits you have that interfere with your biological sleep system, practice relaxation, and allow sleep to come about naturally!  If you find yourself frequently trying too hard to fall asleep, you may wish to try to stay awake instead of trying to fall asleep. You will find this cannot be sustained!         1. EDWIN Vail  (1998) Say Monserrat to Insomnia. [employer]: [place]      Followup in 3-4 weeks.        Your BMI is Body mass index is 21.27 kg/(m^2).  Weight management is a personal decision.   If you are interested in exploring weight loss strategies, the following discussion covers the approaches that may be successful. Body mass index (BMI) is one way to tell whether you are at a healthy weight, overweight, or obese. It measures your weight in relation to your height.  A BMI of 18.5 to 24.9 is in the healthy range. A person with a BMI of 25 to 29.9 is considered overweight, and someone with a BMI of 30 or greater is considered obese. More than two-thirds of American adults are considered overweight or obese.  Being overweight or obese increases the risk for further weight gain. Excess weight may lead to heart disease and diabetes.  Creating and following plans for healthy eating and physical activity may help you improve your health.  Weight control is part of healthy lifestyle and includes exercise, emotional health, and healthy eating habits. Careful eating habits lifelong are the mainstay of weight control. Though there are significant health benefits from weight loss, long-term weight loss with diet alone may be very difficult to achieve- studies show long-term success with dietary management in less than 10% of people. Attaining a healthy weight may be especially difficult to achieve in those with severe obesity. In some cases, medications, devices and surgical management might be considered.  What can you do?  If you are overweight or obese and are interested in methods for weight loss, you should discuss this with your provider.     Consider reducing daily calorie intake by 500 calories.     Keep a food journal.     Avoiding skipping meals, consider cutting portions instead.    Diet combined with exercise helps maintain muscle while optimizing fat loss. Strength training is particularly important for building and maintaining muscle mass. Exercise helps reduce stress, increase energy, and improves fitness. Increasing exercise without diet control, however, may not burn enough calories to loose  weight.       Start walking three days a week 10-20 minutes at a time    Work towards walking thirty minutes five days a week     Eventually, increase the speed of your walking for 1-2 minutes at time    In addition, we recommend that you review healthy lifestyles and methods for weight loss available through the National Institutes of Health patient information sites:  http://win.niddk.nih.gov/publications/index.htm    And look into health and wellness programs that may be available through your health insurance provider, employer, local community center, or violet club.                  Follow-ups after your visit        Follow-up notes from your care team     Return in about 4 weeks (around 3/16/2018) for Insomnia Follow up.      Your next 10 appointments already scheduled     Mar 16, 2018 11:30 AM CDT   Return Sleep Patient with Simeon Moore PsyD   [facility] [place] ([facility] [place])    [address] [place] [id number] [phone]              Who to contact     If you have questions or need follow up information about today's clinic visit or your schedule please contact [facility] at [phone].  Normal or non-critical lab and imaging results will be communicated to you by MyChart, letter or phone within 4 business days after the clinic has received the results. If you do not hear from us within 7 days, please contact the clinic through [facility] or phone. If you have a critical or abnormal lab result, we will notify you by phone as soon as possible.  Submit refill requests through [employer] or call your pharmacy and they will forward the refill request to us. Please allow 3 business days for your refill to be completed.          Additional Information About Your Visit        [facility] Information     [facility] lets you send messages to your doctor, view your test results, renew your prescriptions,  "schedule appointments and more. To sign up, go to [email or web address] . Click on \"Log in\" on the left side of the screen, which will take you to the Welcome page. Then click on \"Sign up Now\" on the right side of the page.     You will be asked to enter the access code listed below, as well as some personal information. Please follow the directions to create your username and password.     Your access code is: [email or web address]  Expires: 3/20/2018 10:36 AM     Your access code will  in 90 days. If you need help or a new code, please call your [facility] clinic or [phone].        Care EveryWhere ID     This is your Care EveryWhere ID. This could be used by other organizations to access your [facility] medical records  [id number]        Your Vitals Were     Pulse Height Pulse Oximetry BMI (Body Mass Index)          67 1.746 m (5' 8.75\") 100% 21.27 kg/m2         Blood Pressure from Last 3 Encounters:   18 113/77   18 127/75   17 98/69    Weight from Last 3 Encounters:   18 64.9 kg (143 lb)   18 65.8 kg (145 lb)   17 65.9 kg (145 lb 3.2 oz)              Today, you had the following     No orders found for display         Today's Medication Changes          These changes are accurate as of 18  1:17 PM.  If you have any questions, ask your nurse or doctor.               Stop taking these medicines if you haven't already. Please contact your care team if you have questions.     LEXAPRO PO   Stopped by:  Simeon Moore PsyD                    Primary Care Provider Fax #    Physician No Ref-Primary [phone]       No address on file        Equal Access to Services     AISHA PADGETT : Vanessa Pagan, vitaliy veloz, jennifer acosta. [facility] [phone].    ATENCIÓN: Si habla español, tiene a cintron disposición servicios gratuitos de asistencia lingüística. [place] al [phone].    We comply with applicable " federal civil rights laws and [place] laws. We do not discriminate on the basis of race, color, national origin, age, disability, sex, sexual orientation, or gender identity.            Thank you!     Thank you for choosing [facility] [place]  for your care. Our goal is always to provide you with excellent care. Hearing back from our patients is one way we can continue to improve our services. Please take a few minutes to complete the written survey that you may receive in the mail after your visit with us. Thank you!             Your Updated Medication List - Protect others around you: Learn how to safely use, store and throw away your medicines at www.disposemymeds.org.          This list is accurate as of 2/16/18  1:17 PM.  Always use your most recent med list.                   Brand Name Dispense Instructions for use Diagnosis    fluocinolone 0.025 % ointment    SYNALAR    15 g    Apply twice daily to lips for 2-3 weeks.        LASIX PO      Take by mouth daily        levothyroxine 100 MCG tablet    SYNTHROID/LEVOTHROID     Take 100 mcg by mouth daily        POTASSIUM CITRATE PO      Take 20 mEq by mouth        spironolactone 50 MG tablet    ALDACTONE    90 tablet    Take 100 mg (2 pills) in the morning and 50 mg in the evening (1 pill).    Acne vulgaris       TEMAZEPAM PO      Take by mouth At Bedtime        tretinoin 0.1 % cream    RETIN-A    20 g    Apply a pea sized amount at bedtime    Acne vulgaris

## 2018-02-16 NOTE — PROGRESS NOTES
SLEEP MEDICINE PROGRESS NOTE  Sleep Psychology    Patient Name: Lolly Azevedo  MRN:  [id number]  Date of Service: Feb 16, 2018       Subjective Report     Lolly Azevedo returns to the sleep clinic today to discuss progress in implementing bbehavioral strategies for the management of chronic insomnia.  Lolly reports good adherence to behavioral plan.  Focus was on working to improve asleep offset consistency.  Sleep latency and wake time after sleep onset have improved.  Sleep anxiety reduced.  No excessive daytime sleepiness or significant tiredness reported discussed relationship between insomnia and anxiety.  Patient states that she discontinued her Lexapro due to side effects.  She consulted with a psychiatrist at [facility] but found it to be unhelpful.  She was given prescriptions for gabapentin but later learned that there had been some contraindications for use of this medication in her case.  She has decided to take metoprolol in the morning which she feels helps reduce physiologic effects of anxiety.  She will continue to follow with her physician around management of anxiety.  Discussed strategies for making sure that she relaxes in the evening, avoid excessive time in bed and does not react to poor night sleep by changing or altering her sleep schedule.  Patient also indicates that she has reduced her use of temazepam from 45 mg to 22.5 mg she now feels that daytime tiredness and lack of alertness may in great part be due to medication side effects.  She was advised to confer with her physician around continued reduction of medication.  We also discussed sleep perception and normal sleep in that being aware of awakenings to the night as normal soreness she can return back to sleep and feels okay during the day.     .     Sleep Data:     Source of Data:  4 weeks of sleep diary    Sleep Latency: 30 min.  Times Awakened: 3  Wake Time After Sleep Onset: 45 min.  Total Sleep Time: 450  "min.  Sleep Efficiency: Approximately 85 %    Total score - [place]: 3 .            Interventions     Strategies and recommendations including stimulus control were discussed today.       Vital Signs     /77  Pulse 67  Ht 1.746 m (5' 8.75\")  Wt 64.9 kg (143 lb)  SpO2 100%  BMI 21.27 kg/m2     Mental Status     Appearance:  Well kept  Orientation:  X3  Mood:  better  Affect:  Congruent with mood  Speech/Language:  Normal  Thought Process: Intact  Associations:  Normal  Thought Content: Normal    Diagnostic Impressions and Plan       1. Psychophysiological insomnia  Improved sleep latency, improved wake after sleep onset though continues to be extended.  Sleep efficiency improved.  Patient has reduced use of medication from 45 mg temazepam to 22.5 mg she will continue to follow with her physician around her goal of continuing to taper the medication.  Patient was provided information about managing generalized anxiety and worry.        Follow-up: 4 weeks    Time Spent: 40 minutes      Simeon Moore PsyD, LOS, CBSM  Certified, Behavioral Sleep Medicine  [facility] [place] [facility] [place]                      "

## 2018-02-27 DIAGNOSIS — L70.9 ACNE: Primary | ICD-10-CM

## 2018-02-27 RX ORDER — FLUOCINOLONE ACETONIDE 0.25 MG/G
OINTMENT TOPICAL
Qty: 15 G | Refills: 0 | Status: SHIPPED | OUTPATIENT
Start: 2018-02-27

## 2018-02-27 NOTE — TELEPHONE ENCOUNTER
"\"Molly refill\" provided. Patient to see provider for further refills.     Rosana MORAN   [facility] RN   "

## 2018-03-09 DIAGNOSIS — L70.0 ACNE VULGARIS: ICD-10-CM

## 2018-03-09 RX ORDER — SPIRONOLACTONE 50 MG/1
TABLET, FILM COATED ORAL
Qty: 90 TABLET | Refills: 1 | Status: SHIPPED | OUTPATIENT
Start: 2018-03-09 | End: 2018-05-02

## 2018-03-09 NOTE — TELEPHONE ENCOUNTER
Medication is being filled for 1 time refill only due to:  Patient needs to be seen because it has been more than one year since last visit. Letter sent to pt to make appt.   Ale WISE RN BSN PHN  [facility]

## 2018-03-09 NOTE — LETTER
[facility]  [address] [place] [id number]  Phone: [phone]       March 9, 2018         Lolly Azevedo  [address] [place], [place] [address] [address]            Dear Lolly:    We are concerned about your health care.  We recently provided you with medication refills.  Many medications require routine follow-up with your doctor.    Your prescription(s) have been refilled for two months so you may have time for the above noted follow-up. Please call to schedule soon so we can assure you have an appointment before your next refills are needed.    Thank you,      Chaparrita STERN / tr

## 2018-04-06 ENCOUNTER — OFFICE VISIT (OUTPATIENT)
Dept: SLEEP MEDICINE | Facility: CLINIC | Age: 53
End: 2018-04-06
Payer: COMMERCIAL

## 2018-04-06 VITALS
OXYGEN SATURATION: 100 % | WEIGHT: 143 LBS | BODY MASS INDEX: 21.67 KG/M2 | SYSTOLIC BLOOD PRESSURE: 100 MMHG | DIASTOLIC BLOOD PRESSURE: 64 MMHG | HEART RATE: 61 BPM | HEIGHT: 68 IN

## 2018-04-06 DIAGNOSIS — F51.04 PSYCHOPHYSIOLOGICAL INSOMNIA: Primary | ICD-10-CM

## 2018-04-06 PROCEDURE — 90834 PSYTX W PT 45 MINUTES: CPT | Performed by: PSYCHOLOGIST

## 2018-04-06 NOTE — PROGRESS NOTES
"SLEEP MEDICINE PROGRESS NOTE  Sleep Psychology    Patient Name: Lolly Azevedo  MRN:  [id number]  Date of Service: Apr 6, 2018       Subjective Report     Lolly Azevedo returns to the sleep clinic today to discuss progress in implementing bbehavioral strategies for the management of psychophysiologic insomnia.  Lolly reports significant improvement in sleep quality with further reduction use of temazepam from 22.5 mg to 7.5 mg nightly focus of session was on patient goal of discontinuing temazepam altogether.  She was advised to confer with her physician about additional tapering strategies.  We did discuss medication elimination 1 day per week.  Patient was cautioned about rebound insomnia.  Second focus of session was on residual sleep specific anxiety and concern about her ability to sleep without medication.  Provided education about the limited effectiveness of medications and besides that the role that behavioral change and management morning sleep.  Also discussed women sleep.     .     Sleep Data:     Source of Data: 2 week sleep diary    Sleep Latency: Less than 30 min.  Times Awakened: 1-2  Wake Time After Sleep Onset: 5-10 min.  Total Sleep Time: Approximately 390 min.  Sleep Efficiency: Between 85-90 %    Total score - [place]: 3 .    PREET Total Score: 8       Interventions     Strategies and recommendations including stimulus control, addressing maladaptive worry/believes about sleep sleep medication in conjunction with medical supervision by primary were discussed today.       Vital Signs     /64  Pulse 61  Ht 1.727 m (5' 8\")  Wt 64.9 kg (143 lb)  SpO2 100%  BMI 21.74 kg/m2     Mental Status     Appearance:  Well kept  Orientation:  X3  Mood:  good  Affect:  Congruent with mood  Speech/Language:  Normal  Thought Process: Intact  Associations:  Normal  Thought Content: Normal    Diagnostic Impressions and Plan       F51.04 Psychophysiologic Insomnia    Sleep latency, wake time after " sleep onset now within normal with significant reduction in use of temazepam.  Sleep efficiency within normal.  Neck steps to coordinate further taper with primary care physician to eliminate use of temazepam.  Behavioral targets and focus will now be a residual sleep specific anxiety/concern to build confidence in her ability to sleep without medication.      Follow-up: 8 weeks    Time Spent: 38 minutes      Simeon Moore PsyD, LOS, CBSM  Certified, Behavioral Sleep Medicine  [facility] [place] [facility] [place]

## 2018-04-06 NOTE — NURSING NOTE
"Chief Complaint   Patient presents with     Sleep Problem     insomnia       Initial /64  Pulse 61  Ht 1.727 m (5' 8\")  Wt 64.9 kg (143 lb)  SpO2 100%  BMI 21.74 kg/m2 Estimated body mass index is 21.74 kg/(m^2) as calculated from the following:    Height as of this encounter: 1.727 m (5' 8\").    Weight as of this encounter: 64.9 kg (143 lb).  Medication Reconciliation: complete    "

## 2018-04-06 NOTE — MR AVS SNAPSHOT
After Visit Summary   4/6/2018    Lolly Azevedo    MRN: [id number]           Patient Information     Date Of Birth          1965        Visit Information        Provider Department      4/6/2018 10:00 AM Simeon Moore PsyD [facility] Instructions    Good job on improving you sleep and reducing the amount of temazepam you are taking.      Continue with your sleep schedule and I would have you consider removing your TV from you bedroom.  Watch TV in another room and go to bed when sleepy.  Make sure if you watch TV that it is in low light setting and don't watch shows that are too animating.    Talk with your doctor about a taper schedule.  Remember that if you don't take temazepam on a given night you may experience rebound insomnia and that this is not how your sleep would be longer term.  You also continue to have some apprehension about your insomnia. Below are some strategies to have a more health attitude about sleep.    \STRATEGIES FOR DEVELOPING A HEALTHY ATTITUDE ABOUT SLEEP    Insomnia is often is triggered by stressful events such as a change in employment, a separation, medical illness, or loss.  Your response to your sleep problem, mainly your thoughts and behaviors, determines in large part whether the sleep disturbance will resolve or develop a chronic course well after the stressful event is over.  Insomnia is more likely to persist over time if a person interprets this situational insomnia as a sign of danger or loss of control - and because of this begins to monitor sleep loss and worry about its consequences.  Unhealthy worry, fears and beliefs about insomnia can become a vicious cycle of emotional distress and increased sleep disturbance.  Negative beliefs about sleep produce increasing stress and pressure to sleep.  We call this unhelpful sleep effort. The following are strategies for changing beliefs and attitudes about sleep  than may be contributing to your continued insomnia.    Changing How You Think About Insomnia      You may be surprised to learn that prior to the 1900 s, most medicines given by physicians were worthless.  Yet, remarkably, patients still improved.  How was this possible?  It was due to the patient s belief in the physician and the medicine, which mobilized strong self-healing mechanisms.  Today, this effect has been referred to as the placebo effect and it is recognized as a significant component of all medical treatments. Recent research has challenged the traditional separation of the mind and body in medicine. The placebo effect is a powerful reminder that that how we think and what we believe has a profound impact on our brain and body.  We now know that by managing stress and thinking more positively we can improve our health and promote healing (1).  Negative sleep thoughts in particular can have a profoundly adverse effect on your insomnia. They can lead to elevated fear or anxiety about sleep which in turn can aggravate your sleep problem.    Examples of Unhelpful Sleep Thoughts    Unhelpful thoughts about sleep can have a profound impact on your ability to get a good night s sleep. Below are some examples of negative thoughts associated with insomnia that may sound familiar to you:     I must get 8 hours of sleep to function during the day      Insomnia is going to ruin my health      I have lost control of my sleep      I m dreading bedtime      I didn t sleep at all last night and know I won t sleep tomorrow night either      I can t sleep without a sleeping pill       These types of thoughts are often based on fear and myths about how sleep works. They produce feelings of anxiety and fear, emotions that fuel the stress response casing increased heart rate and brain wave activity.  This in turn activates your brain s wakefulness system and weakens your natural sleep drive resulting in increased  difficulty falling and staying asleep.      Negative thoughts usually occur automatically and feel like a knee-jerk reaction.  They are often inaccurate and distorted, especially late at night as you become increasingly tired, it is dark, quiet, and others are asleep.      Cognitive Restructuring: Changing Unhelpful Sleep Thoughts    Now that you understand the impact that negative thoughts can have on your sleep, you are ready to change these unhelpful thoughts using cognitive restructuring.  The process - called cognitive restructuring - is powerfully simple:  By recognizing and replacing your negative thoughts about sleep with more accurate, positive thoughts you will be less anxious and frustrated.  You will stop working so hard to sleep.  As a result, you will be able to relax and more easily sleep through the night.    There are several important steps involved in changing your unhelpful and negative thoughts about sleep:    1. The most important first step is recognizing and identifying your unhelpful sleep thoughts and the emotional reaction they produce.     2. A second and equally important step is to identify these thoughts without any judgment. It is important that you accept your thoughts as they are before you try to reframe and change them.     3.  The next step is to replace or reframe the negative thoughts related to sleep with more accurate and positive thoughts.  To facilitate this process, we have provided a list of positive, accurate thoughts to gradually incorporate into your beliefs about your sleep.  These positive thoughts are based on sleep research.  Read over these positive thoughts and choose those that seem to best reflect your situation.  We also encourage you to generate your own positive and accurate about your sleep.  Whenever you notice a negative thought enter you mind, replace it with one of your positive thoughts.  Rehearse these positive thoughts each day.  By practicing  cognitive restructuring each day, you will notice that your frame of mind will shift leading to a much more positive attitude about your sleep.      Examples of Helpful Sleep Thoughts     My work will not suffer significantly if I have a poor night's sleep.      I m probably getting more sleep than I think.  My daytime functioning is not affected only by my sleep.      Since I didn t sleep well last night, I am more likely to sleep well tonight due  to a biological pressure to recover my sleep loss.      Sleep requirements vary from person to person.      In most cases, the worst thing that can happen if I don t sleep well is that my mood might not be as bright the next day.      If I awaken after about five and a half hours of sleep, I have gotten all the sleep I really need.      I m more likely to fall asleep as my body temperature falls throughout the night.      It is normal to initially feel alert if I awaken at the beginning or end of a dream; drowsiness will soon follow.      My functioning will improve during the day as my body temperature rises.      My sleep will be improving as I learn and apply the behavioral techniques I am taught.      These techniques have worked for others, and they will work for me.     Other Recommendations for Changing  Beliefs and Attitudes About Your Sleep    1. Keep Realistic Expectations:  There is a widespread belief that 8 hours of sleep is necessary to feel refreshed and function well during the day. Some believe that  good sleep  means never waking up at night.  Others come to expect that they should always wake up in the morning feeling completely rested and full of energy.  Concerns and worries may arise when such expectations are not met.  You may believe that you need this much sleep but the fact remains that there are individual differences in sleep requirements.  Some require as little as 4-5 hours whereas others require 10.  Thus, there is no  gold standard  for  adequate sleep duration.  To overcome insomnia, you must keep your expectations realistic and understand that some days you may not sleep as well as you desire.  Average normal sleepers experience two nights of disrupted sleep per week. There is no reason to be alarmed by a few nights of poor sleep.  To overcome insomnia you need to avoid placing undue pressure on yourself to achieve certain sleep standards as this is likely to increase performance anxiety and perpetuate your sleep problem.     2. Revise Attributions about the Causes of Insomnia:  There is a natural tendency to attribute   one s sleep problem to external factors (e.g., chemical imbalance, pain, and aging), over which     a person may have little control.  Although these factors may contribute to your sleep disturbance, there are many things you can do to offset the effects of such factors including changing bad sleep habits and unhelpful beliefs about sleep.    3. Don t Blame Insomnia for All Daytime Impairments:  Many individuals blame insomnia for   everything that goes wrong during the day--fatigue, irritability, and concentration problems.  Although poor sleep may indeed produce some of these consequences, the exclusive attribution of all daytime impairments to insomnia is erroneous and, ultimately, perpetuates the insomnia course.  So ask yourself the following question prior to blaming insomnia for a particular impairment:   Could something else be bothering me and affecting my daytime functioning?      4. Don t Catastrophize after a Poor Night s Sleep:  Sometimes worrying turns into catastrophic  thinking.  Some patients are concerned that insomnia may have serious consequences on their health, others believe poor sleep will deteriorate their physical appearance, and still others see insomnia as an indication of complete loss of control in their lives.  Quite frankly, it is often these types of concerns about the perceived consequences of  insomnia, rather than the sleep problem itself, which prompt patients to seek treatment.  Keep in mind that insomnia can be very unpleasant but it is not necessarily dangerous.    5. Don t Place Too Much Emphasis on Sleep:  Some individual significantly reduce their activity level because of poor sleep and lack of energy.  Although sleep occupies nearly one third of our lives, and is a necessary part of our life, don t give it more importance than it deserves.  Make sure you continue to engage in your normal activities despite your insomnia.          6. Develop some Tolerance to the Effects of Sleep Loss:  Instead of  dwelling on insomnia and its negative effects on daytime functioning, a  more productive approach is developing some tolerance to sleep loss.   Encourage yourself to go on with your daily routine and activities            even after a poor night s sleep.  A useful experiment is scheduling a  pleasant activity after a poor night s sleep.  You may learn that sleepiness  alone does not prevent you from doing things you enjoy.    6. Never Try to Sleep:  If we could highlight one cardinal cognitive-behavioral  rule of sleep it would be  Never Try to Force Yourself to Sleep  because sleep cannot be achieved on command.  Whenever a person tries too hard to control or to accomplish anything, it backfires and actually impairs performance producing classic performance anxiety.  Instead, change whatever unhelpful habits you have that interfere with your biological sleep system, practice relaxation, and allow sleep to come about naturally!  If you find yourself frequently trying too hard to fall asleep, you may wish to try to stay awake instead of trying to fall asleep. You will find this cannot be sustained!         1. EDWIN Vail  (1998) Say [place]err[place] to Insomnia. [employer]: [place]      .s  Your BMI is Body mass index is 21.74 kg/(m^2).  Weight management is a personal decision.  If you are interested in  exploring weight loss strategies, the following discussion covers the approaches that may be successful. Body mass index (BMI) is one way to tell whether you are at a healthy weight, overweight, or obese. It measures your weight in relation to your height.  A BMI of 18.5 to 24.9 is in the healthy range. A person with a BMI of 25 to 29.9 is considered overweight, and someone with a BMI of 30 or greater is considered obese. More than two-thirds of American adults are considered overweight or obese.  Being overweight or obese increases the risk for further weight gain. Excess weight may lead to heart disease and diabetes.  Creating and following plans for healthy eating and physical activity may help you improve your health.  Weight control is part of healthy lifestyle and includes exercise, emotional health, and healthy eating habits. Careful eating habits lifelong are the mainstay of weight control. Though there are significant health benefits from weight loss, long-term weight loss with diet alone may be very difficult to achieve- studies show long-term success with dietary management in less than 10% of people. Attaining a healthy weight may be especially difficult to achieve in those with severe obesity. In some cases, medications, devices and surgical management might be considered.  What can you do?  If you are overweight or obese and are interested in methods for weight loss, you should discuss this with your provider.     Consider reducing daily calorie intake by 500 calories.     Keep a food journal.     Avoiding skipping meals, consider cutting portions instead.    Diet combined with exercise helps maintain muscle while optimizing fat loss. Strength training is particularly important for building and maintaining muscle mass. Exercise helps reduce stress, increase energy, and improves fitness. Increasing exercise without diet control, however, may not burn enough calories to loose weight.       Start walking  "three days a week 10-20 minutes at a time    Work towards walking thirty minutes five days a week     Eventually, increase the speed of your walking for 1-2 minutes at time    In addition, we recommend that you review healthy lifestyles and methods for weight loss available through the National Institutes of Health patient information sites:  http://win.niddk.nih.gov/publications/index.htm    And look into health and wellness programs that may be available through your health insurance provider, employer, local community center, or violet club.                  Follow-ups after your visit        Follow-up notes from your care team     Return in about 8 weeks (around 5/31/2018) for Insomnia Follow up on May 31.      Who to contact     If you have questions or need follow up information about today's clinic visit or your schedule please contact [facility] directly at [phone].  Normal or non-critical lab and imaging results will be communicated to you by MyChart, letter or phone within 4 business days after the clinic has received the results. If you do not hear from us within 7 days, please contact the clinic through [employer] or phone. If you have a critical or abnormal lab result, we will notify you by phone as soon as possible.  Submit refill requests through [employer] or call your pharmacy and they will forward the refill request to us. Please allow 3 business days for your refill to be completed.          Additional Information About Your Visit        MyChart Information     [employer] lets you send messages to your doctor, view your test results, renew your prescriptions, schedule appointments and more. To sign up, go to [email or web address] . Click on \"Log in\" on the left side of the screen, which will take you to the Welcome page. Then click on \"Sign up Now\" on the right side of the page.     You will be asked to enter the access code listed below, as well as some personal " "information. Please follow the directions to create your username and password.     Your access code is: [email or web address]  Expires: 2018 10:41 AM     Your access code will  in 90 days. If you need help or a new code, please call your [facility] clinic or [phone].        Care EveryWhere ID     This is your Care EveryWhere ID. This could be used by other organizations to access your [facility] medical records  [id number]        Your Vitals Were     Pulse Height Pulse Oximetry BMI (Body Mass Index)          61 1.727 m (5' 8\") 100% 21.74 kg/m2         Blood Pressure from Last 3 Encounters:   18 100/64   18 113/77   18 127/75    Weight from Last 3 Encounters:   18 64.9 kg (143 lb)   18 64.9 kg (143 lb)   18 65.8 kg (145 lb)              Today, you had the following     No orders found for display       Primary Care Provider Fax #    Physician No Ref-Primary [phone]       No address on file        Equal Access to Services     AISHA APDGETT : Hadii hay Pagan, wanieshada roselia, qaybta kaalmaconnor quinones, jennifer ramos . So [facility] [phone].    ATENCIÓN: Si habla español, tiene a cintron disposición servicios gratuitos de asistencia lingüística. [facility] [phone].    We comply with applicable federal civil rights laws and [place] laws. We do not discriminate on the basis of race, color, national origin, age, disability, sex, sexual orientation, or gender identity.            Thank you!     Thank you for choosing [facility]  for your care. Our goal is always to provide you with excellent care. Hearing back from our patients is one way we can continue to improve our services. Please take a few minutes to complete the written survey that you may receive in the mail after your visit with us. Thank you!             Your Updated Medication List - Protect others around you: Learn how to safely use, store and throw " away your medicines at www.disposemymeds.org.          This list is accurate as of 4/6/18 10:41 AM.  Always use your most recent med list.                   Brand Name Dispense Instructions for use Diagnosis    fluocinolone 0.025 % ointment    SYNALAR    15 g    Apply twice daily to lips for 2-3 weeks. Please see provider for further refills    Acne       LASIX PO      Take by mouth daily        levothyroxine 100 MCG tablet    SYNTHROID/LEVOTHROID     Take 100 mcg by mouth daily        POTASSIUM CITRATE PO      Take 20 mEq by mouth        spironolactone 50 MG tablet    ALDACTONE    90 tablet    Take 100 mg (2 pills) in the morning and 50 mg in the evening (1 pill).    Acne vulgaris       TEMAZEPAM PO      Take by mouth At Bedtime        tretinoin 0.1 % cream    RETIN-A    20 g    Apply a pea sized amount at bedtime    Acne vulgaris

## 2018-04-06 NOTE — PATIENT INSTRUCTIONS
Good job on improving you sleep and reducing the amount of temazepam you are taking.      Continue with your sleep schedule and I would have you consider removing your TV from you bedroom.  Watch TV in another room and go to bed when sleepy.  Make sure if you watch TV that it is in low light setting and don't watch shows that are too animating.    Talk with your doctor about a taper schedule.  Remember that if you don't take temazepam on a given night you may experience rebound insomnia and that this is not how your sleep would be longer term.  You also continue to have some apprehension about your insomnia. Below are some strategies to have a more health attitude about sleep.    \STRATEGIES FOR DEVELOPING A HEALTHY ATTITUDE ABOUT SLEEP    Insomnia is often is triggered by stressful events such as a change in employment, a separation, medical illness, or loss.  Your response to your sleep problem, mainly your thoughts and behaviors, determines in large part whether the sleep disturbance will resolve or develop a chronic course well after the stressful event is over.  Insomnia is more likely to persist over time if a person interprets this situational insomnia as a sign of danger or loss of control - and because of this begins to monitor sleep loss and worry about its consequences.  Unhealthy worry, fears and beliefs about insomnia can become a vicious cycle of emotional distress and increased sleep disturbance.  Negative beliefs about sleep produce increasing stress and pressure to sleep.  We call this unhelpful sleep effort. The following are strategies for changing beliefs and attitudes about sleep than may be contributing to your continued insomnia.    Changing How You Think About Insomnia      You may be surprised to learn that prior to the 1900 s, most medicines given by physicians were worthless.  Yet, remarkably, patients still improved.  How was this possible?  It was due to the patient s belief in the  physician and the medicine, which mobilized strong self-healing mechanisms.  Today, this effect has been referred to as the placebo effect and it is recognized as a significant component of all medical treatments. Recent research has challenged the traditional separation of the mind and body in medicine. The placebo effect is a powerful reminder that that how we think and what we believe has a profound impact on our brain and body.  We now know that by managing stress and thinking more positively we can improve our health and promote healing (1).  Negative sleep thoughts in particular can have a profoundly adverse effect on your insomnia. They can lead to elevated fear or anxiety about sleep which in turn can aggravate your sleep problem.    Examples of Unhelpful Sleep Thoughts    Unhelpful thoughts about sleep can have a profound impact on your ability to get a good night s sleep. Below are some examples of negative thoughts associated with insomnia that may sound familiar to you:     I must get 8 hours of sleep to function during the day      Insomnia is going to ruin my health      I have lost control of my sleep      I m dreading bedtime      I didn t sleep at all last night and know I won t sleep tomorrow night either      I can t sleep without a sleeping pill       These types of thoughts are often based on fear and myths about how sleep works. They produce feelings of anxiety and fear, emotions that fuel the stress response casing increased heart rate and brain wave activity.  This in turn activates your brain s wakefulness system and weakens your natural sleep drive resulting in increased difficulty falling and staying asleep.      Negative thoughts usually occur automatically and feel like a knee-jerk reaction.  They are often inaccurate and distorted, especially late at night as you become increasingly tired, it is dark, quiet, and others are asleep.      Cognitive Restructuring: Changing Unhelpful Sleep  Thoughts    Now that you understand the impact that negative thoughts can have on your sleep, you are ready to change these unhelpful thoughts using cognitive restructuring.  The process - called cognitive restructuring - is powerfully simple:  By recognizing and replacing your negative thoughts about sleep with more accurate, positive thoughts you will be less anxious and frustrated.  You will stop working so hard to sleep.  As a result, you will be able to relax and more easily sleep through the night.    There are several important steps involved in changing your unhelpful and negative thoughts about sleep:    1. The most important first step is recognizing and identifying your unhelpful sleep thoughts and the emotional reaction they produce.     2. A second and equally important step is to identify these thoughts without any judgment. It is important that you accept your thoughts as they are before you try to reframe and change them.     3.  The next step is to replace or reframe the negative thoughts related to sleep with more accurate and positive thoughts.  To facilitate this process, we have provided a list of positive, accurate thoughts to gradually incorporate into your beliefs about your sleep.  These positive thoughts are based on sleep research.  Read over these positive thoughts and choose those that seem to best reflect your situation.  We also encourage you to generate your own positive and accurate about your sleep.  Whenever you notice a negative thought enter you mind, replace it with one of your positive thoughts.  Rehearse these positive thoughts each day.  By practicing cognitive restructuring each day, you will notice that your frame of mind will shift leading to a much more positive attitude about your sleep.      Examples of Helpful Sleep Thoughts     My work will not suffer significantly if I have a poor night's sleep.      I m probably getting more sleep than I think.  My daytime  functioning is not affected only by my sleep.      Since I didn t sleep well last night, I am more likely to sleep well tonight due  to a biological pressure to recover my sleep loss.      Sleep requirements vary from person to person.      In most cases, the worst thing that can happen if I don t sleep well is that my mood might not be as bright the next day.      If I awaken after about five and a half hours of sleep, I have gotten all the sleep I really need.      I m more likely to fall asleep as my body temperature falls throughout the night.      It is normal to initially feel alert if I awaken at the beginning or end of a dream; drowsiness will soon follow.      My functioning will improve during the day as my body temperature rises.      My sleep will be improving as I learn and apply the behavioral techniques I am taught.      These techniques have worked for others, and they will work for me.     Other Recommendations for Changing  Beliefs and Attitudes About Your Sleep    1. Keep Realistic Expectations:  There is a widespread belief that 8 hours of sleep is necessary to feel refreshed and function well during the day. Some believe that  good sleep  means never waking up at night.  Others come to expect that they should always wake up in the morning feeling completely rested and full of energy.  Concerns and worries may arise when such expectations are not met.  You may believe that you need this much sleep but the fact remains that there are individual differences in sleep requirements.  Some require as little as 4-5 hours whereas others require 10.  Thus, there is no  gold standard  for adequate sleep duration.  To overcome insomnia, you must keep your expectations realistic and understand that some days you may not sleep as well as you desire.  Average normal sleepers experience two nights of disrupted sleep per week. There is no reason to be alarmed by a few nights of poor sleep.  To overcome insomnia  you need to avoid placing undue pressure on yourself to achieve certain sleep standards as this is likely to increase performance anxiety and perpetuate your sleep problem.     2. Revise Attributions about the Causes of Insomnia:  There is a natural tendency to attribute   one s sleep problem to external factors (e.g., chemical imbalance, pain, and aging), over which     a person may have little control.  Although these factors may contribute to your sleep disturbance, there are many things you can do to offset the effects of such factors including changing bad sleep habits and unhelpful beliefs about sleep.    3. Don t Blame Insomnia for All Daytime Impairments:  Many individuals blame insomnia for   everything that goes wrong during the day--fatigue, irritability, and concentration problems.  Although poor sleep may indeed produce some of these consequences, the exclusive attribution of all daytime impairments to insomnia is erroneous and, ultimately, perpetuates the insomnia course.  So ask yourself the following question prior to blaming insomnia for a particular impairment:   Could something else be bothering me and affecting my daytime functioning?      4. Don t Catastrophize after a Poor Night s Sleep:  Sometimes worrying turns into catastrophic  thinking.  Some patients are concerned that insomnia may have serious consequences on their health, others believe poor sleep will deteriorate their physical appearance, and still others see insomnia as an indication of complete loss of control in their lives.  Quite frankly, it is often these types of concerns about the perceived consequences of insomnia, rather than the sleep problem itself, which prompt patients to seek treatment.  Keep in mind that insomnia can be very unpleasant but it is not necessarily dangerous.    5. Don t Place Too Much Emphasis on Sleep:  Some individual significantly reduce their activity level because of poor sleep and lack of energy.   Although sleep occupies nearly one third of our lives, and is a necessary part of our life, don t give it more importance than it deserves.  Make sure you continue to engage in your normal activities despite your insomnia.          6. Develop some Tolerance to the Effects of Sleep Loss:  Instead of  dwelling on insomnia and its negative effects on daytime functioning, a  more productive approach is developing some tolerance to sleep loss.   Encourage yourself to go on with your daily routine and activities            even after a poor night s sleep.  A useful experiment is scheduling a  pleasant activity after a poor night s sleep.  You may learn that sleepiness  alone does not prevent you from doing things you enjoy.    6. Never Try to Sleep:  If we could highlight one cardinal cognitive-behavioral  rule of sleep it would be  Never Try to Force Yourself to Sleep  because sleep cannot be achieved on command.  Whenever a person tries too hard to control or to accomplish anything, it backfires and actually impairs performance producing classic performance anxiety.  Instead, change whatever unhelpful habits you have that interfere with your biological sleep system, practice relaxation, and allow sleep to come about naturally!  If you find yourself frequently trying too hard to fall asleep, you may wish to try to stay awake instead of trying to fall asleep. You will find this cannot be sustained!         1. EDWIN Vail  (1998) Say Monserrat to Insomnia. [employer]: [place]      .s  Your BMI is Body mass index is 21.74 kg/(m^2).  Weight management is a personal decision.  If you are interested in exploring weight loss strategies, the following discussion covers the approaches that may be successful. Body mass index (BMI) is one way to tell whether you are at a healthy weight, overweight, or obese. It measures your weight in relation to your height.  A BMI of 18.5 to 24.9 is in the healthy range. A person with a BMI  of 25 to 29.9 is considered overweight, and someone with a BMI of 30 or greater is considered obese. More than two-thirds of American adults are considered overweight or obese.  Being overweight or obese increases the risk for further weight gain. Excess weight may lead to heart disease and diabetes.  Creating and following plans for healthy eating and physical activity may help you improve your health.  Weight control is part of healthy lifestyle and includes exercise, emotional health, and healthy eating habits. Careful eating habits lifelong are the mainstay of weight control. Though there are significant health benefits from weight loss, long-term weight loss with diet alone may be very difficult to achieve- studies show long-term success with dietary management in less than 10% of people. Attaining a healthy weight may be especially difficult to achieve in those with severe obesity. In some cases, medications, devices and surgical management might be considered.  What can you do?  If you are overweight or obese and are interested in methods for weight loss, you should discuss this with your provider.     Consider reducing daily calorie intake by 500 calories.     Keep a food journal.     Avoiding skipping meals, consider cutting portions instead.    Diet combined with exercise helps maintain muscle while optimizing fat loss. Strength training is particularly important for building and maintaining muscle mass. Exercise helps reduce stress, increase energy, and improves fitness. Increasing exercise without diet control, however, may not burn enough calories to loose weight.       Start walking three days a week 10-20 minutes at a time    Work towards walking thirty minutes five days a week     Eventually, increase the speed of your walking for 1-2 minutes at time    In addition, we recommend that you review healthy lifestyles and methods for weight loss available through the National Institutes of Health patient  information sites:  http://win.niddk.nih.gov/publications/index.htm    And look into health and wellness programs that may be available through your health insurance provider, employer, local community center, or violet club.

## 2018-05-02 DIAGNOSIS — L70.0 ACNE VULGARIS: ICD-10-CM

## 2018-05-02 RX ORDER — SPIRONOLACTONE 50 MG/1
TABLET, FILM COATED ORAL
Qty: 30 TABLET | Refills: 0 | Status: SHIPPED | OUTPATIENT
Start: 2018-05-02 | End: 2018-05-09

## 2018-05-02 NOTE — LETTER
[facility]  [address] [facility] [address]  Phone: [phone]    May 2, 2018    Lolly Azevedo                                                                                                            [address] [place] [address]  [place] [address]            Dear MsAdalgisa Jolly,    We are concerned about your health care.  We recently provided you with a medication refill.  Many medications require routine follow-up with your Dermatology Provider.      At this time we ask that: You schedule a routine office visit with your Dermatology Provider to follow your Acne. You need to be seen at least annually for a medication refill. You were last seen in Dermatology clinic on 2-15-17 and a letter was sent to notify you of need to be seen on 3-9-18.    Your prescription: Has . A one month salvatore refill has been provided so you may have time for the above noted follow-up. No further refills will be authorized until follow up care is completed.     Thank you,      Chaparrita STERN / mmc

## 2018-05-02 NOTE — TELEPHONE ENCOUNTER
spironolactone (ALDACTONE) 50 MG tablet    Date Last Filled: 04/09/2018  QTY: 90    [facility]

## 2018-05-09 DIAGNOSIS — L70.0 ACNE VULGARIS: ICD-10-CM

## 2018-05-09 RX ORDER — SPIRONOLACTONE 50 MG/1
TABLET, FILM COATED ORAL
Qty: 60 TABLET | Refills: 1 | Status: SHIPPED | OUTPATIENT
Start: 2018-05-09 | End: 2018-06-12

## 2018-05-09 NOTE — TELEPHONE ENCOUNTER
Will refill to get patient to appointment June 12th- patient must be seen in clinic for further refills.     Rosana MORAN RN   Specialty Clinics

## 2018-05-09 NOTE — TELEPHONE ENCOUNTER
LOV:  2/15/17  LAST REFILL:  ?  Pt is needing refill until her appt on June 12th   She only got a 10 day supply last time on 5/2

## 2018-06-12 ENCOUNTER — OFFICE VISIT (OUTPATIENT)
Dept: DERMATOLOGY | Facility: CLINIC | Age: 53
End: 2018-06-12
Payer: COMMERCIAL

## 2018-06-12 VITALS — DIASTOLIC BLOOD PRESSURE: 67 MMHG | OXYGEN SATURATION: 100 % | HEART RATE: 56 BPM | SYSTOLIC BLOOD PRESSURE: 117 MMHG

## 2018-06-12 DIAGNOSIS — L70.0 ACNE VULGARIS: ICD-10-CM

## 2018-06-12 PROCEDURE — 99213 OFFICE O/P EST LOW 20 MIN: CPT | Performed by: PHYSICIAN ASSISTANT

## 2018-06-12 NOTE — MR AVS SNAPSHOT
After Visit Summary   6/12/2018    Lolly Azevedo    MRN: [id number]           Patient Information     Date Of Birth          1965        Visit Information        Provider Department      6/12/2018 4:20 PM Chaparrita Roberts PA-C [facility]        Today's Diagnoses     Acne vulgaris           Follow-ups after your visit        Who to contact     If you have questions or need follow up information about today's clinic visit or your schedule please contact [facility] directly at [phone].  Normal or non-critical lab and imaging results will be communicated to you by MyChart, letter or phone within 4 business days after the clinic has received the results. If you do not hear from us within 7 days, please contact the clinic through MyChart or phone. If you have a critical or abnormal lab result, we will notify you by phone as soon as possible.  Submit refill requests through [employer] or call your pharmacy and they will forward the refill request to us. Please allow 3 business days for your refill to be completed.          Additional Information About Your Visit        Care EveryWhere ID     This is your Care EveryWhere ID. This could be used by other organizations to access your [facility] medical records  [id number]        Your Vitals Were     Pulse Pulse Oximetry                56 100%           Blood Pressure from Last 3 Encounters:   06/12/18 117/67   04/06/18 100/64   02/16/18 113/77    Weight from Last 3 Encounters:   04/06/18 64.9 kg (143 lb)   02/16/18 64.9 kg (143 lb)   01/12/18 65.8 kg (145 lb)              Today, you had the following     No orders found for display         Where to get your medicines      These medications were sent to [facility] [address] [address] [facility] [address]  [address] [facility] [place] [address]     Phone:  [phone]     spironolactone 50 MG tablet          Primary Care  Provider Fax #    Physician No Ref-Primary [phone]       No address on file        Equal Access to Services     AISHA PADGETT : Hadii aad ku hadrichbreezy Pagan, matticonnor reneetrinityha, kriss motta drakekain, jennifer neetu sangitagutierrez juantristen valdemargodfreyreyes snyder. So [facility] [phone].    ATENCIÓN: Si habla español, tiene a cintron disposición servicios gratuitos de asistencia lingüística. Sherylame al [phone].    We comply with applicable federal civil rights laws and [place] laws. We do not discriminate on the basis of race, color, national origin, age, disability, sex, sexual orientation, or gender identity.            Thank you!     Thank you for choosing [facility]  for your care. Our goal is always to provide you with excellent care. Hearing back from our patients is one way we can continue to improve our services. Please take a few minutes to complete the written survey that you may receive in the mail after your visit with us. Thank you!             Your Updated Medication List - Protect others around you: Learn how to safely use, store and throw away your medicines at www.disposemymeds.org.          This list is accurate as of 6/12/18 11:59 PM.  Always use your most recent med list.                   Brand Name Dispense Instructions for use Diagnosis    fluocinolone 0.025 % ointment    SYNALAR    15 g    Apply twice daily to lips for 2-3 weeks. Please see provider for further refills    Acne       LASIX PO      Take by mouth daily        levothyroxine 100 MCG tablet    SYNTHROID/LEVOTHROID     Take 100 mcg by mouth daily        POTASSIUM CITRATE PO      Take 20 mEq by mouth        spironolactone 50 MG tablet    ALDACTONE    90 tablet    Take 100 mg (2 pills) in the morning and 50 mg in the evening (1 pill).    Acne vulgaris       TEMAZEPAM PO      Take by mouth At Bedtime        tretinoin 0.1 % cream    RETIN-A    20 g    Apply a pea sized amount at bedtime    Acne vulgaris

## 2018-06-12 NOTE — LETTER
6/12/2018         RE: Lolly Azevedo  [address] [facility]  [place] [address]        Dear Colleague,    Thank you for referring your patient, Lolly Azevedo, to the [facility]. Please see a copy of my visit note below.    Lolly Azevedo is a 52 year old year old female patient here today for recheck acne vulgaris.  Patient reports that acne has been doing well with spironolactone 100 mg in the morning and 50 mg in the evening. She denies side effects from regimen. She also notes some scaly bump on dorsal feet and shin. She denies any itchy. She reports that she has been using a cream and that it has been helping.  Patient has no other skin complaints today.  Remainder of the HPI, Meds, PMH, Allergies, FH, and SH was reviewed in chart.    No past medical history on file.    No past surgical history on file.     Family History   Problem Relation Age of Onset     CANCER Mother      skin cancer       Social History     Social History     Marital status:      Spouse name: N/A     Number of children: N/A     Years of education: N/A     Occupational History     Not on file.     Social History Main Topics     Smoking status: Never Smoker     Smokeless tobacco: Never Used     Alcohol use Yes      Comment: occ     Drug use: No     Sexual activity: Yes     Partners: Male     Other Topics Concern     Not on file     Social History Narrative       Outpatient Encounter Prescriptions as of 6/12/2018   Medication Sig Dispense Refill     fluocinolone (SYNALAR) 0.025 % ointment Apply twice daily to lips for 2-3 weeks. Please see provider for further refills 15 g 0     Furosemide (LASIX PO) Take by mouth daily       levothyroxine (SYNTHROID/LEVOTHROID) 100 MCG tablet Take 100 mcg by mouth daily       POTASSIUM CITRATE PO Take 20 mEq by mouth       spironolactone (ALDACTONE) 50 MG tablet Take 100 mg (2 pills) in the morning and 50 mg in the evening (1 pill). 60 tablet 1     tretinoin (RETIN-A) 0.1 % cream  Apply a pea sized amount at bedtime 20 g 3     TEMAZEPAM PO Take by mouth At Bedtime       No facility-administered encounter medications on file as of 6/12/2018.              Review Of Systems  Skin: As above  Eyes: negative  Ears/Nose/Throat: negative  Respiratory: No shortness of breath, dyspnea on exertion, cough, or hemoptysis  Cardiovascular: negative  Gastrointestinal: negative  Genitourinary: negative  Musculoskeletal: negative  Neurologic: negative  Psychiatric: negative  Hematologic/Lymphatic/Immunologic: negative  Endocrine: negative      O:   NAD, WDWN, Alert & Oriented, Mood & Affect wnl, Vitals stable   Here today alone   /67  Pulse 56  SpO2 100%   General appearance normal   Vitals stable   Alert, oriented and in no acute distress     Light brown stuck on papules on dorsal feet, anterior ankles  Face is clear, no inflammatory lesions seen       Eyes: Conjunctivae/lids:Normal     ENT: Lips: normal    MSK:Normal    Cardiovascular: peripheral edema none    Pulm: Breathing Normal    Neuro/Psych: Orientation:Normal; Mood/Affect:Normal  A/P:  1. Acne vulgaris   Last blood work done in August was normal in care everywhere. She has a physical scheduled in a month with PCP and will have blood work checked there.   Continue spironolactone 100 mg in the morning and 50 mg in the evening.   Continue tretinoin, which is prescribed by PCP.   2. Keratoses on feet   BENIGN LESIONS DISCUSSED WITH PATIENT:  I discussed the specifics of tumor, prognosis, and genetics of benign lesions.  I explained that treatment of these lesions would be purely cosmetic and not medically neccessary.  I discussed with patient different removal options including excision, cautery and /or laser.      Nature and genetics of benign skin lesions dicussed with patient.  Signs and Symptoms of skin cancer discussed with patient.  ABCDEs of melanoma reviewed with patient.  Patient encouraged to perform monthly skin exams.  UV precautions  reviewed with patient.  Risks of non-melanoma skin cancer discussed with patient   Return to clinic in one year or sooner if needed.       Again, thank you for allowing me to participate in the care of your patient.        Sincerely,        Chaparrita Carvalho PA-C

## 2018-06-13 RX ORDER — SPIRONOLACTONE 50 MG/1
TABLET, FILM COATED ORAL
Qty: 90 TABLET | Refills: 11 | Status: SHIPPED | OUTPATIENT
Start: 2018-06-13 | End: 2019-06-21

## 2018-06-13 NOTE — PROGRESS NOTES
Lolly Azevedo is a 52 year old year old female patient here today for recheck acne vulgaris.  Patient reports that acne has been doing well with spironolactone 100 mg in the morning and 50 mg in the evening. She denies side effects from regimen. She also notes some scaly bump on dorsal feet and shin. She denies any itchy. She reports that she has been using a cream and that it has been helping.  Patient has no other skin complaints today.  Remainder of the HPI, Meds, PMH, Allergies, FH, and SH was reviewed in chart.    No past medical history on file.    No past surgical history on file.     Family History   Problem Relation Age of Onset     CANCER Mother      skin cancer       Social History     Social History     Marital status:      Spouse name: N/A     Number of children: N/A     Years of education: N/A     Occupational History     Not on file.     Social History Main Topics     Smoking status: Never Smoker     Smokeless tobacco: Never Used     Alcohol use Yes      Comment: occ     Drug use: No     Sexual activity: Yes     Partners: Male     Other Topics Concern     Not on file     Social History Narrative       Outpatient Encounter Prescriptions as of 6/12/2018   Medication Sig Dispense Refill     fluocinolone (SYNALAR) 0.025 % ointment Apply twice daily to lips for 2-3 weeks. Please see provider for further refills 15 g 0     Furosemide (LASIX PO) Take by mouth daily       levothyroxine (SYNTHROID/LEVOTHROID) 100 MCG tablet Take 100 mcg by mouth daily       POTASSIUM CITRATE PO Take 20 mEq by mouth       spironolactone (ALDACTONE) 50 MG tablet Take 100 mg (2 pills) in the morning and 50 mg in the evening (1 pill). 60 tablet 1     tretinoin (RETIN-A) 0.1 % cream Apply a pea sized amount at bedtime 20 g 3     TEMAZEPAM PO Take by mouth At Bedtime       No facility-administered encounter medications on file as of 6/12/2018.              Review Of Systems  Skin: As above  Eyes:  negative  Ears/Nose/Throat: negative  Respiratory: No shortness of breath, dyspnea on exertion, cough, or hemoptysis  Cardiovascular: negative  Gastrointestinal: negative  Genitourinary: negative  Musculoskeletal: negative  Neurologic: negative  Psychiatric: negative  Hematologic/Lymphatic/Immunologic: negative  Endocrine: negative      O:   NAD, WDWN, Alert & Oriented, Mood & Affect wnl, Vitals stable   Here today alone   /67  Pulse 56  SpO2 100%   General appearance normal   Vitals stable   Alert, oriented and in no acute distress     Light brown stuck on papules on dorsal feet, anterior ankles  Face is clear, no inflammatory lesions seen       Eyes: Conjunctivae/lids:Normal     ENT: Lips: normal    MSK:Normal    Cardiovascular: peripheral edema none    Pulm: Breathing Normal    Neuro/Psych: Orientation:Normal; Mood/Affect:Normal  A/P:  1. Acne vulgaris   Last blood work done in August was normal in care everywhere. She has a physical scheduled in a month with PCP and will have blood work checked there.   Continue spironolactone 100 mg in the morning and 50 mg in the evening.   Continue tretinoin, which is prescribed by PCP.   2. Keratoses on feet   BENIGN LESIONS DISCUSSED WITH PATIENT:  I discussed the specifics of tumor, prognosis, and genetics of benign lesions.  I explained that treatment of these lesions would be purely cosmetic and not medically neccessary.  I discussed with patient different removal options including excision, cautery and /or laser.      Nature and genetics of benign skin lesions dicussed with patient.  Signs and Symptoms of skin cancer discussed with patient.  ABCDEs of melanoma reviewed with patient.  Patient encouraged to perform monthly skin exams.  UV precautions reviewed with patient.  Risks of non-melanoma skin cancer discussed with patient   Return to clinic in one year or sooner if needed.

## 2018-07-11 DIAGNOSIS — M79.673 FOOT PAIN: Primary | ICD-10-CM

## 2018-07-11 LAB
BASOPHILS # BLD AUTO: 0 10E9/L (ref 0–0.2)
BASOPHILS NFR BLD AUTO: 0.6 %
CRP SERPL-MCNC: <2.9 MG/L (ref 0–8)
DIFFERENTIAL METHOD BLD: ABNORMAL
EOSINOPHIL # BLD AUTO: 0.2 10E9/L (ref 0–0.7)
EOSINOPHIL NFR BLD AUTO: 2.6 %
ERYTHROCYTE [DISTWIDTH] IN BLOOD BY AUTOMATED COUNT: 11.8 % (ref 10–15)
ERYTHROCYTE [SEDIMENTATION RATE] IN BLOOD BY WESTERGREN METHOD: 41 MM/H (ref 0–30)
HCT VFR BLD AUTO: 44.7 % (ref 35–47)
HGB BLD-MCNC: 15.4 G/DL (ref 11.7–15.7)
LYMPHOCYTES # BLD AUTO: 2.1 10E9/L (ref 0.8–5.3)
LYMPHOCYTES NFR BLD AUTO: 29.3 %
MCH RBC QN AUTO: 33.1 PG (ref 26.5–33)
MCHC RBC AUTO-ENTMCNC: 34.5 G/DL (ref 31.5–36.5)
MCV RBC AUTO: 96 FL (ref 78–100)
MONOCYTES # BLD AUTO: 0.6 10E9/L (ref 0–1.3)
MONOCYTES NFR BLD AUTO: 8.6 %
NEUTROPHILS # BLD AUTO: 4.3 10E9/L (ref 1.6–8.3)
NEUTROPHILS NFR BLD AUTO: 58.9 %
PLATELET # BLD AUTO: 306 10E9/L (ref 150–450)
RBC # BLD AUTO: 4.65 10E12/L (ref 3.8–5.2)
URATE SERPL-MCNC: 6.9 MG/DL (ref 2.6–6)
WBC # BLD AUTO: 7.2 10E9/L (ref 4–11)

## 2018-07-11 PROCEDURE — 85025 COMPLETE CBC W/AUTO DIFF WBC: CPT | Performed by: PODIATRIST

## 2018-07-11 PROCEDURE — 84550 ASSAY OF BLOOD/URIC ACID: CPT | Performed by: PODIATRIST

## 2018-07-11 PROCEDURE — 36415 COLL VENOUS BLD VENIPUNCTURE: CPT | Performed by: PODIATRIST

## 2018-07-11 PROCEDURE — 86140 C-REACTIVE PROTEIN: CPT | Performed by: PODIATRIST

## 2018-07-11 PROCEDURE — 85652 RBC SED RATE AUTOMATED: CPT | Performed by: PODIATRIST

## 2019-06-21 DIAGNOSIS — L70.0 ACNE VULGARIS: ICD-10-CM

## 2019-06-21 RX ORDER — SPIRONOLACTONE 50 MG/1
TABLET, FILM COATED ORAL
Qty: 90 TABLET | Refills: 2 | Status: SHIPPED | OUTPATIENT
Start: 2019-06-21 | End: 2019-09-30

## 2019-06-21 NOTE — TELEPHONE ENCOUNTER
Medication is being filled for 1 time refill only due to:  Patient needs to be seen because it has been more than one year since last visit. Letter sent to make appt.   Ale WISE RN BSN PHN  Specialty Clinics

## 2019-06-21 NOTE — TELEPHONE ENCOUNTER
Reason for Call:  Medication or medication refill:    Do you use a [facility]?  Name of the pharmacy and phone number for the current request:  [facility] [place]  [phone]    Name of the medication requested: spironolactone    Other request: LOV:  6/12/18  LAST REFILL:  5/13/19      Can we leave a detailed message on this number? YES    Phone number patient can be reached at: Home number on file [phone] (home)    Best Time: any     Call taken on 6/21/2019 at 3:13 PM by Amanda Franco

## 2019-09-30 DIAGNOSIS — L70.0 ACNE VULGARIS: ICD-10-CM

## 2019-09-30 RX ORDER — SPIRONOLACTONE 50 MG/1
TABLET, FILM COATED ORAL
Qty: 90 TABLET | Refills: 0 | Status: SHIPPED | OUTPATIENT
Start: 2019-09-30 | End: 2019-11-01

## 2019-09-30 NOTE — TELEPHONE ENCOUNTER
> 1 years since seen. Needs appointment. Letter was sent to notify of need to be seen on 6-21-19. A 2nd one month rx and letter have been sent  and note sent to pharmacy as well. Tena Henson RN

## 2019-09-30 NOTE — LETTER
[facility]  [address]  [facility] [address]  Phone: [phone]    2019    Lolly Azevedo                                                                                                               [id number] [place] [address]  [place] [address]            Dear MsAdalgisa Jolly,    We are concerned about your health care.  We recently provided you with a medication refill.  Many medications require routine follow-up with your Dermatology Provider.      At this time we ask that: You schedule a routine office visit with your Dermatology Provider to follow your Acne. You need to be seen at least annually to renew a prescription. You were last seen in Dermatology clinic on 2018.    Your prescription: Is  and you have been given a 3 month salvatore refill on 19 so you may have time for the above noted follow-up. No follow up appointment was made. (A letter was also mailed to you on 19 to notify of need to be seen.) You are being given one more one month salvatore refill, but medication cannot be refilled again until you are seen for follow up.    We are currently booking appointments 6-8 weeks in advance.     Thank you,      Chaparrita STERN / mmc

## 2019-10-28 DIAGNOSIS — L70.0 ACNE VULGARIS: ICD-10-CM

## 2019-10-28 RX ORDER — SPIRONOLACTONE 50 MG/1
TABLET, FILM COATED ORAL
Qty: 1 TABLET | Refills: 0 | OUTPATIENT
Start: 2019-10-28

## 2019-10-28 NOTE — TELEPHONE ENCOUNTER
Last seen in Derm clinic June 2018 and letter sent to notify of need to be seen [phone] but no appt has been made. Tena Henson RN

## 2019-10-28 NOTE — TELEPHONE ENCOUNTER
Reason for Call:  Medication or medication refill:    Do you use a [facility]?  Name of the pharmacy and phone number for the current request:  [facility] (Ph: [phone])    Name of the medication requested: Spironolactone    Other request:   LAST REFILL: 09/30/2019  LOV: 06/12/2018    Can we leave a detailed message on this number? Not Applicable    Phone number patient can be reached at: Home number on file [phone] (home)    Best Time: NA    Call taken on 10/28/2019 at 8:18 AM by Denise Behrendt

## 2019-11-01 RX ORDER — SPIRONOLACTONE 50 MG/1
TABLET, FILM COATED ORAL
Qty: 90 TABLET | Refills: 1 | Status: SHIPPED | OUTPATIENT
Start: 2019-11-01

## 2019-11-01 NOTE — TELEPHONE ENCOUNTER
Pt has scheduled an appt for 12/18 (soonest available). Can she get the spironolactone filled to get her through until that appt.     PHARMACY:  [facility]    If questions, contact pt @:  [phone]    Denise Behrendt  Specialty CSS

## 2021-02-15 DIAGNOSIS — L70.0 ACNE VULGARIS: ICD-10-CM

## 2021-02-15 RX ORDER — SPIRONOLACTONE 50 MG/1
TABLET, FILM COATED ORAL
Qty: 90 TABLET | Refills: 1 | Status: CANCELLED | OUTPATIENT
Start: 2021-02-15

## 2021-02-15 NOTE — TELEPHONE ENCOUNTER
" Called the [facility]'s pharmacy and patient has been getting Spironolactone form a \"Dr. Salinas\" so she may have entered an old rx number as she was last seen here in June of 2018 and was advised by Phone and letter of need to be seen to obtain a renewed rx.     Pharmacy thought patient may have calledin an old rx number. Tena Henson RN    "

## 2021-02-15 NOTE — TELEPHONE ENCOUNTER
Requested Prescriptions   Pending Prescriptions Disp Refills     spironolactone (ALDACTONE) 50 MG tablet 90 tablet 1     Sig: Take 100 mg (2 pills) in the morning and 50 mg in the evening (1 pill).       There is no refill protocol information for this order      Last Written Prescription Date:    Last Fill Quantity: ,  # refills:    Last office visit: Visit date not found with prescribing provider:     Future Office Visit: